# Patient Record
Sex: FEMALE | Race: WHITE | Employment: FULL TIME | URBAN - METROPOLITAN AREA
[De-identification: names, ages, dates, MRNs, and addresses within clinical notes are randomized per-mention and may not be internally consistent; named-entity substitution may affect disease eponyms.]

---

## 2017-02-28 RX ORDER — SIMVASTATIN 20 MG/1
TABLET, FILM COATED ORAL
Qty: 30 TAB | Refills: 3 | Status: SHIPPED | OUTPATIENT
Start: 2017-02-28 | End: 2017-07-01 | Stop reason: SDUPTHER

## 2017-03-01 DIAGNOSIS — I10 ESSENTIAL HYPERTENSION, BENIGN: ICD-10-CM

## 2017-03-02 RX ORDER — VALSARTAN 160 MG/1
TABLET ORAL
Qty: 30 TAB | Refills: 3 | Status: SHIPPED | OUTPATIENT
Start: 2017-03-02 | End: 2017-07-01 | Stop reason: SDUPTHER

## 2017-03-11 DIAGNOSIS — G47.00 INSOMNIA, UNSPECIFIED TYPE: ICD-10-CM

## 2017-03-13 NOTE — TELEPHONE ENCOUNTER
Spoke with patient, told her medication refill will be forwarded to Dr. Carmelita Jane, and will be in office 3-14-17.     I also asked patient to schedule appointment for HTN management, LOV was 6/2016

## 2017-03-14 RX ORDER — PANTOPRAZOLE SODIUM 40 MG/1
TABLET, DELAYED RELEASE ORAL
Qty: 30 TAB | Refills: 4 | Status: SHIPPED | OUTPATIENT
Start: 2017-03-14 | End: 2017-08-23 | Stop reason: SDUPTHER

## 2017-03-14 RX ORDER — TRAZODONE HYDROCHLORIDE 50 MG/1
TABLET ORAL
Qty: 30 TAB | Refills: 4 | Status: SHIPPED | OUTPATIENT
Start: 2017-03-14 | End: 2017-08-23 | Stop reason: SDUPTHER

## 2017-03-16 DIAGNOSIS — J45.30 ALLERGIC ASTHMA, MILD PERSISTENT, UNCOMPLICATED: ICD-10-CM

## 2017-03-16 DIAGNOSIS — J30.9 ALLERGIC RHINITIS: ICD-10-CM

## 2017-03-16 RX ORDER — MONTELUKAST SODIUM 10 MG/1
TABLET ORAL
Qty: 30 TAB | Refills: 5 | Status: SHIPPED | OUTPATIENT
Start: 2017-03-16 | End: 2017-08-23 | Stop reason: SDUPTHER

## 2017-06-01 DIAGNOSIS — I10 ESSENTIAL HYPERTENSION, BENIGN: ICD-10-CM

## 2017-06-01 RX ORDER — METOPROLOL TARTRATE 100 MG/1
TABLET ORAL
Qty: 90 TAB | Refills: 4 | Status: SHIPPED | OUTPATIENT
Start: 2017-06-01 | End: 2017-08-23 | Stop reason: ALTCHOICE

## 2017-07-01 DIAGNOSIS — I10 ESSENTIAL HYPERTENSION, BENIGN: ICD-10-CM

## 2017-07-03 RX ORDER — SIMVASTATIN 20 MG/1
TABLET, FILM COATED ORAL
Qty: 30 TAB | Refills: 3 | Status: SHIPPED | OUTPATIENT
Start: 2017-07-03 | End: 2017-08-23

## 2017-07-03 RX ORDER — VALSARTAN 160 MG/1
TABLET ORAL
Qty: 30 TAB | Refills: 3 | Status: SHIPPED | OUTPATIENT
Start: 2017-07-03 | End: 2017-08-23 | Stop reason: SDUPTHER

## 2017-08-12 DIAGNOSIS — G47.00 INSOMNIA, UNSPECIFIED TYPE: ICD-10-CM

## 2017-08-14 DIAGNOSIS — G47.00 INSOMNIA, UNSPECIFIED TYPE: ICD-10-CM

## 2017-08-15 RX ORDER — PANTOPRAZOLE SODIUM 40 MG/1
TABLET, DELAYED RELEASE ORAL
Qty: 30 TAB | Refills: 4 | OUTPATIENT
Start: 2017-08-15

## 2017-08-15 RX ORDER — TRAZODONE HYDROCHLORIDE 50 MG/1
TABLET ORAL
Qty: 30 TAB | Refills: 4 | OUTPATIENT
Start: 2017-08-15

## 2017-08-15 NOTE — TELEPHONE ENCOUNTER
Patient not in office for > 1 year. Please call and confirm she remains and active patient at this office and if so she needs to make follow-up appointment for refills.      Thanks  Irma Domínguez MD

## 2017-08-16 RX ORDER — TRAZODONE HYDROCHLORIDE 50 MG/1
TABLET ORAL
Qty: 30 TAB | Refills: 4 | OUTPATIENT
Start: 2017-08-16

## 2017-08-16 RX ORDER — PANTOPRAZOLE SODIUM 40 MG/1
TABLET, DELAYED RELEASE ORAL
Qty: 30 TAB | Refills: 4 | OUTPATIENT
Start: 2017-08-16

## 2017-08-16 NOTE — TELEPHONE ENCOUNTER
Pharmacy notified pt has not been in office in over a year. Pharmacy states they would let pt know f/u needed before refills can be given.

## 2017-08-23 ENCOUNTER — OFFICE VISIT (OUTPATIENT)
Dept: INTERNAL MEDICINE CLINIC | Age: 47
End: 2017-08-23

## 2017-08-23 VITALS
BODY MASS INDEX: 24.61 KG/M2 | HEIGHT: 68 IN | HEART RATE: 75 BPM | TEMPERATURE: 99 F | RESPIRATION RATE: 20 BRPM | DIASTOLIC BLOOD PRESSURE: 76 MMHG | OXYGEN SATURATION: 99 % | SYSTOLIC BLOOD PRESSURE: 127 MMHG | WEIGHT: 162.4 LBS

## 2017-08-23 DIAGNOSIS — G47.00 INSOMNIA, UNSPECIFIED TYPE: ICD-10-CM

## 2017-08-23 DIAGNOSIS — R74.8 ELEVATED LIVER ENZYMES: ICD-10-CM

## 2017-08-23 DIAGNOSIS — F10.20 UNCOMPLICATED ALCOHOL DEPENDENCE (HCC): ICD-10-CM

## 2017-08-23 DIAGNOSIS — J30.2 SEASONAL ALLERGIC RHINITIS, UNSPECIFIED ALLERGIC RHINITIS TRIGGER: ICD-10-CM

## 2017-08-23 DIAGNOSIS — I10 ESSENTIAL HYPERTENSION, BENIGN: ICD-10-CM

## 2017-08-23 DIAGNOSIS — A60.00 GENITAL HERPES SIMPLEX, UNSPECIFIED SITE: ICD-10-CM

## 2017-08-23 DIAGNOSIS — K29.70 GASTRITIS WITHOUT BLEEDING, UNSPECIFIED CHRONICITY, UNSPECIFIED GASTRITIS TYPE: ICD-10-CM

## 2017-08-23 DIAGNOSIS — J45.30 ALLERGIC ASTHMA, MILD PERSISTENT, UNCOMPLICATED: ICD-10-CM

## 2017-08-23 DIAGNOSIS — E83.42 HYPOMAGNESEMIA: ICD-10-CM

## 2017-08-23 DIAGNOSIS — A60.04 HERPES SIMPLEX VULVOVAGINITIS: ICD-10-CM

## 2017-08-23 DIAGNOSIS — E78.5 HYPERLIPIDEMIA, UNSPECIFIED HYPERLIPIDEMIA TYPE: Primary | ICD-10-CM

## 2017-08-23 RX ORDER — VALACYCLOVIR HYDROCHLORIDE 500 MG/1
500 TABLET, FILM COATED ORAL DAILY
Qty: 90 TAB | Refills: 3 | Status: SHIPPED | OUTPATIENT
Start: 2017-08-23 | End: 2018-08-16 | Stop reason: SDUPTHER

## 2017-08-23 RX ORDER — VALSARTAN 160 MG/1
160 TABLET ORAL DAILY
Qty: 90 TAB | Refills: 3 | Status: SHIPPED | OUTPATIENT
Start: 2017-08-23 | End: 2017-10-12 | Stop reason: SDUPTHER

## 2017-08-23 RX ORDER — TRAZODONE HYDROCHLORIDE 50 MG/1
50 TABLET ORAL
Qty: 90 TAB | Refills: 3 | Status: SHIPPED | OUTPATIENT
Start: 2017-08-23 | End: 2018-08-16 | Stop reason: SDUPTHER

## 2017-08-23 RX ORDER — PANTOPRAZOLE SODIUM 40 MG/1
TABLET, DELAYED RELEASE ORAL
Qty: 90 TAB | Refills: 4 | Status: SHIPPED | OUTPATIENT
Start: 2017-08-23 | End: 2018-03-09 | Stop reason: SDUPTHER

## 2017-08-23 RX ORDER — METOPROLOL SUCCINATE 100 MG/1
100 TABLET, EXTENDED RELEASE ORAL DAILY
Qty: 90 TAB | Refills: 3 | Status: SHIPPED | OUTPATIENT
Start: 2017-08-23 | End: 2018-08-16 | Stop reason: SDUPTHER

## 2017-08-23 RX ORDER — MONTELUKAST SODIUM 10 MG/1
10 TABLET ORAL DAILY
Qty: 90 TAB | Refills: 5 | Status: SHIPPED | OUTPATIENT
Start: 2017-08-23 | End: 2018-08-16 | Stop reason: SDUPTHER

## 2017-08-23 RX ORDER — ATORVASTATIN CALCIUM 10 MG/1
10 TABLET, FILM COATED ORAL DAILY
Qty: 90 TAB | Refills: 3 | Status: SHIPPED | OUTPATIENT
Start: 2017-08-23 | End: 2018-08-16 | Stop reason: SDUPTHER

## 2017-08-23 NOTE — MR AVS SNAPSHOT
Visit Information Date & Time Provider Department Dept. Phone Encounter #  
 8/23/2017  2:30 PM Shamir Randolph MD [address] [facility] Internal Medicine [phone] [id number] Follow-up Instructions Return for well woman visit, review of vaccines and fasting labs. Kee Wells Upcoming Health Maintenance Date Due Pneumococcal 19-64 Medium Risk (1 of 1 - PPSV23) 12/31/1989 PAP AKA CERVICAL CYTOLOGY 12/31/1991 INFLUENZA AGE 9 TO ADULT 8/1/2017 DTaP/Tdap/Td series (2 - Td) 6/1/2026 Allergies as of 8/23/2017  Review Complete On: 8/23/2017 By: Madeline Griffin LPN No Known Allergies Current Immunizations  Never Reviewed Name Date Influenza Vaccine (Quad) PF 9/17/2014 Tdap 6/1/2016 Not reviewed this visit You Were Diagnosed With   
  
 Codes Comments Hyperlipidemia, unspecified hyperlipidemia type    -  Primary ICD-10-CM: E78.5 ICD-9-CM: 272.4 Insomnia, unspecified type     ICD-10-CM: G47.00 ICD-9-CM: 780.52 Seasonal allergic rhinitis, unspecified allergic rhinitis trigger     ICD-10-CM: J30.2 ICD-9-CM: 477.9 Allergic asthma, mild persistent, uncomplicated     QXP-72-LD: J45.30 ICD-9-CM: 493.00 Essential hypertension, benign     ICD-10-CM: I10 
ICD-9-CM: 401.1 Genital herpes simplex, unspecified site     ICD-10-CM: A60.00 ICD-9-CM: 054.10 Gastritis without bleeding, unspecified chronicity, unspecified gastritis type     ICD-10-CM: K29.70 ICD-9-CM: 535.50 Uncomplicated alcohol dependence ([address]     ICD-10-CM: F10.20 ICD-9-CM: 303.90 Elevated liver enzymes     ICD-10-CM: R74.8 ICD-9-CM: 790.5 Herpes simplex vulvovaginitis     ICD-10-CM: A60.04 
ICD-9-CM: 054.11 Hypomagnesemia     ICD-10-CM: F41.79 
ICD-9-CM: 275.2 Vitals BP Pulse Temp Resp Height(growth percentile) Weight(growth percentile)  127/76 (BP 1 Location: Left arm, BP Patient Position: Sitting) 75 99 °F (37.2 °C) (Oral) 20 5' 8\" (1.727 m) 162 lb 6.4 oz (73.7 kg) SpO2 BMI OB Status Smoking Status 99% 24.69 kg/m2 IUD Former Smoker BMI and BSA Data Body Mass Index Body Surface Area  
 24.69 kg/m 2 1.88 m 2 Preferred Pharmacy Pharmacy Name Phone [facility], [address] [phone] Your Updated Medication List  
  
   
This list is accurate as of: 8/23/17  3:21 PM.  Always use your most recent med list.  
  
  
  
  
 ACIDOPHILUS Cap Generic drug:  Lactobacillus acidophilus Take 2 Caps by mouth two (2) times a day. albuterol 90 mcg/actuation inhaler Commonly known as:  PROVENTIL HFA, VENTOLIN HFA, PROAIR HFA Take 2 Puffs by inhalation every four (4) hours as needed for Wheezing. atorvastatin 10 mg tablet Commonly known as:  LIPITOR Take 1 Tab by mouth daily. levonorgestrel 20 mcg/24 hr (5 years) IUD Commonly known as:  MIRENA  
1 Each by IntraUTERine route once. metoprolol succinate 100 mg tablet Commonly known as:  TOPROL-XL Take 1 Tab by mouth daily. montelukast 10 mg tablet Commonly known as:  SINGULAIR Take 1 Tab by mouth daily. pantoprazole 40 mg tablet Commonly known as:  PROTONIX  
take 1 tablet by mouth once daily  
  
 traZODone 50 mg tablet Commonly known as:  Gwendlyn Lean Take 1 Tab by mouth nightly. valACYclovir 500 mg tablet Commonly known as:  VALTREX Take 1 Tab by mouth daily. Indications: GENITAL HERPES SIMPLEX  
  
 valsartan 160 mg tablet Commonly known as:  DIOVAN Take 1 Tab by mouth daily. Prescriptions Sent to Pharmacy Refills  
 valACYclovir (VALTREX) 500 mg tablet 3 Sig: Take 1 Tab by mouth daily. Indications: GENITAL HERPES SIMPLEX Class: Normal  
 Pharmacy: [facility] [address] [facility], [address], [address] Ph #: [phone]  Route: Oral  
 pantoprazole (PROTONIX) 40 mg tablet 4  
 Sig: take 1 tablet by mouth once daily Class: Normal  
 Pharmacy: [address] [facility], [address] [place] [address] Ph #: [phone]  
 traZODone (DESYREL) 50 mg tablet 3 Sig: Take 1 Tab by mouth nightly. Class: Normal  
 Pharmacy: [address], [address] [place] [address] Ph #: [phone] Route: Oral  
 montelukast (SINGULAIR) 10 mg tablet 5 Sig: Take 1 Tab by mouth daily. Class: Normal  
 Pharmacy: [address], [address] [place] [address] Ph #: [phone] Route: Oral  
 valsartan (DIOVAN) 160 mg tablet 3 Sig: Take 1 Tab by mouth daily. Class: Normal  
 Pharmacy: [address], [address] [place] [address] Ph #: [phone] Route: Oral  
 atorvastatin (LIPITOR) 10 mg tablet 3 Sig: Take 1 Tab by mouth daily. Class: Normal  
 Pharmacy: [address], [address] [place] [address] Ph #: [phone] Route: Oral  
 metoprolol succinate (TOPROL-XL) 100 mg tablet 3 Sig: Take 1 Tab by mouth daily. Class: Normal  
 Pharmacy: [address], [address] [place] [address] Ph #: [phone] Route: Oral  
  
We Performed the Following CBC WITH AUTOMATED DIFF [41468 CPT(R)] LIPID PANEL [84436 CPT(R)] METABOLIC PANEL, COMPREHENSIVE [23646 CPT(R)] Follow-up Instructions Return for well woman visit, review of vaccines and fasting labs. .  
  
  
Patient Instructions Learning About Sleeping Well What does sleeping well mean? Sleeping well means getting enough sleep. How much sleep is enough varies among people. The number of hours you sleep is not as important as how you feel when you wake up. If you do not feel refreshed, you probably need more sleep. Another sign of not getting enough sleep is feeling tired during the day. The average total nightly sleep time is 7½ to 8 hours. Healthy adults may need a little more or a little less than this. Why is getting enough sleep important? Getting enough quality sleep is a basic part of good health. When your sleep suffers, your mood and your thoughts can suffer too. You may find yourself feeling more grumpy or stressed. Not getting enough sleep also can lead to serious problems, including injury, accidents, anxiety, and depression. What might cause poor sleeping? Many things can cause sleep problems, including: · Stress. Stress can be caused by fear about a single event, such as giving a speech. Or you may have ongoing stress, such as worry about work or school. · Depression, anxiety, and other mental or emotional conditions. · Changes in your sleep habits or surroundings. This includes changes that happen where you sleep, such as noise, light, or sleeping in a different bed. It also includes changes in your sleep pattern, such as having jet lag or working a late shift. · Health problems, such as pain, breathing problems, and restless legs syndrome. · Lack of regular exercise. How can you help yourself? Here are some tips that may help you sleep more soundly and wake up feeling more refreshed. Your sleeping area · Use your bedroom only for sleeping and sex. A bit of light reading may help you fall asleep. But if it doesn't, do your reading elsewhere in the house. Don't watch TV in bed. · Be sure your bed is big enough to stretch out comfortably, especially if you have a sleep partner. · Keep your bedroom quiet, dark, and cool. Use curtains, blinds, or a sleep mask to block out light. To block out noise, use earplugs, soothing music, or a \"white noise\" machine. Your evening and bedtime routine · Create a relaxing bedtime routine. You might want to take a warm shower or bath, listen to soothing music, or drink a cup of noncaffeinated tea. · Go to bed at the same time every night. And get up at the same time every morning, even if you feel tired. What to avoid · Limit caffeine (coffee, tea, caffeinated sodas) during the day, and don't have any for at least 4 to 6 hours before bedtime. · Don't drink alcohol before bedtime. Alcohol can cause you to wake up more often during the night. · Don't smoke or use tobacco, especially in the evening. Nicotine can keep you awake. · Don't take naps during the day, especially close to bedtime. · Don't lie in bed awake for too long. If you can't fall asleep, or if you wake up in the middle of the night and can't get back to sleep within 15 minutes or so, get out of bed and go to another room until you feel sleepy. · Don't take medicine right before bed that may keep you awake or make you feel hyper or energized. Your doctor can tell you if your medicine may do this and if you can take it earlier in the day. If you can't sleep · Imagine yourself in a peaceful, pleasant scene. Focus on the details and feelings of being in a place that is relaxing. · Get up and do a quiet or boring activity until you feel sleepy. · Don't drink any liquids after 6 p.m. if you wake up often because you have to go to the bathroom. Where can you learn more? Go to http://aissatou-yasir.info/. Enter V235 in the search box to learn more about \"Learning About Sleeping Well. \" Current as of: July 26, 2016 Content Version: 11.3 © 2751-6915 "[employer]". Care instructions adapted under license by [employer] (which disclaims liability or warranty for this information). If you have questions about a medical condition or this instruction, always ask your healthcare professional. [place] [address] any warranty or liability for your use of this information. Hepatitis: Care Instructions Your Care Instructions Hepatitis is inflammation of the liver. It's caused most often by a virus. It can also be caused by heavy drinking over a long time. Certain medicines can make hepatitis worse. When this condition is severe, the liver can't remove waste from the body. Your body also can't do its other jobs as it should. · Hepatitis A is spread by food or water that has the virus. This type doesn't lead to long-term liver problems. · Hepatitis B is spread through infected blood, semen, or other body fluids during sex. It's also spread by sharing needles to inject drugs. Most people who have it get better after 4 to 8 weeks. After you have had this virus, you will not get it again. If it stays in your body for a long time, it can cause serious liver damage. · Hepatitis C is spread by sharing needles to use drugs. It is sometimes spread through infected blood, semen, or other body fluids during sex. Most people who have this virus have a long-term infection. Sometimes it causes severe liver damage. · Hepatitis from alcohol use can lead to severe liver problems. If you stop drinking, your liver most often will get better. · Some medicines can cause liver damage. These include over-the-counter and herbal medicines. The infection most often goes away when you stop taking the medicine. But this may not be the case if serious liver damage has already happened. · Hepatitis also can be caused when the immune system attacks the liver. This is called autoimmune hepatitis. You can help your liver healor lower the chance of liver damageby following your doctor's advice. Follow-up care is a key part of your treatment and safety. Be sure to make and go to all appointments, and call your doctor if you are having problems. It's also a good idea to know your test results and keep a list of the medicines you take. How can you care for yourself at home? · Be safe with medicines.  If your doctor prescribes antiviral medicine, take it exactly as directed. Do not stop or change a medicine without talking to your doctor first. 
· Lower your activity to match your energy. · Avoid alcohol for as long as your doctor says. Alcohol can make liver problems worse. Tell your doctor if you need help to quit. Counseling, support groups, and sometimes medicines can help you stay sober. · Make sure your doctor knows all the medicines you take. Do not take any new medicines unless your doctor says it is okay. · Follow your doctor's advice about your diet. · If you have itchy skin, keep cool, stay out of the sun. Try to wear cotton clothing. Talk to your doctor about using over-the-counter medicines for itching. These include diphenhydramine (Benadryl) and chlorpheniramine (Chlor-Trimeton). Follow the instructions on the label. To prevent spreading hepatitis B or C 
· Tell the people you live with or have sex with about your illness as soon as you can. · Don't donate blood or blood products, organs, semen, or eggs (ova). · Stop all sexual activity or use latex condoms until your doctor tells you that you can no longer give the virus to others. Avoid anal contact with a sex partner while you are infected. · Don't share your personal items. These include razors, toothbrushes, towels, and nail files. · Tell your doctor, dentist, and anyone else who may come in contact with your blood about your illness. · If you are pregnant, tell the doctor who will deliver your baby about your illness. If you have hepatitis B, be sure your baby gets medicine to prevent infection. This should start right after birth. · Clean or carefully get rid of anything that has your blood on it. This includes clothing and sanitary pads. · Make sure to clean surfaces that have your blood or any other body fluid on them. Examples are semen and menstrual blood. Use a solution of 1 part bleach to 10 parts water. Clean toilet seats, countertops, and floors. To prevent hepatitis A · Always wash your hands after you use the bathroom. And be sure to wash them before you touch food. · If you have been exposed to someone who may have hepatitis A, ask your doctor about a shot of immune globulin. (This is also called gamma globulin.) It can help your body fight the infection. When should you call for help? Call 911 anytime you think you may need emergency care. For example, call if: 
· You passed out (lost consciousness). · You have severe belly pain or swelling. · You have severe problems breathing. · You vomit blood. Call your doctor now or seek immediate medical care if: 
· You are confused, or your confusion gets worse. · You have a fever or chills. · Your skin or the whites of your eyes turn yellow or get more yellow. · You have belly pain or swelling. · You vomit or feel sick to your stomach. · Your urine is dark yellow-brown, or your stools are light-colored. · Your stools are black and tarlike or have streaks of blood. Watch closely for changes in your health, and be sure to contact your doctor if: 
· Your skin itches, or itching gets worse. · You are not able to eat well and are losing weight. · You feel more tired than usual. 
Where can you learn more? Go to http://aissatou-yasir.info/. Enter [phone] in the search box to learn more about \"Hepatitis: Care Instructions. \" Current as of: March 3, 2017 Content Version: 11.3 © 4852-4318 [employer]. Care instructions adapted under license by [employer] (which disclaims liability or warranty for this information). If you have questions about a medical condition or this instruction, always ask your healthcare professional. [place] [address] any warranty or liability for your use of this information. Introducing \[facility] [place] [facility]\"" & HEALTH SERVICES!    
 [facility] introduces [employer] patient portal. Now you can access parts of your medical record, email your doctor's office, and request medication refills online. 1. In your internet browser, go to [email or web address] 2. Click on the First Time User? Click Here link in the Sign In box. You will see the New Member Sign Up page. 3. Enter your [employer] Access Code exactly as it appears below. You will not need to use this code after youve completed the sign-up process. If you do not sign up before the expiration date, you must request a new code. · [employer] Access Code: [email or web address] Expires: 11/21/2017  2:38 PM 
 
4. Enter the last four digits of your Social Security Number (xxxx) and Date of Birth (mm/dd/yyyy) as indicated and click Submit. You will be taken to the next sign-up page. 5. Create a [employer] ID. This will be your [employer] login ID and cannot be changed, so think of one that is secure and easy to remember. 6. Create a [employer] password. You can change your password at any time. 7. Enter your Password Reset Question and Answer. This can be used at a later time if you forget your password. 8. Enter your e-mail address. You will receive e-mail notification when new information is available in [address] 9. Click Sign Up. You can now view and download portions of your medical record. 10. Click the Download Summary menu link to download a portable copy of your medical information. If you have questions, please visit the Frequently Asked Questions section of the [employer] website. Remember, [employer] is NOT to be used for urgent needs. For medical emergencies, dial 911. Now available from your iPhone and Android! Please provide this summary of care documentation to your next provider. Your primary care clinician is listed as Geraldine Salinas. If you have any questions after today's visit, please call [phone].

## 2017-08-23 NOTE — PROGRESS NOTES
Rm#2  Chief Complaint   Patient presents with    Medication Evaluation     1. Have you been to the ER, urgent care clinic since your last visit? Hospitalized since your last visit? 2. Have you seen or consulted any other health care providers outside of the [address] [place] [address] since your last visit? Include any pap smears or colon screening.    Health Maintenance Due   Topic Date Due    Pneumococcal 19-64 Medium Risk (1 of 1 - PPSV23) 12/31/1989    PAP AKA CERVICAL CYTOLOGY  12/31/1991    INFLUENZA AGE 9 TO ADULT  08/01/2017   PAP 9-2016 star j[facility]   Flu vaccine doesn't take vaccine   reviewed     PHQ over the last two weeks 8/23/2017   Little interest or pleasure in doing things Not at all   Feeling down, depressed or hopeless Not at all   Total Score PHQ 2 0

## 2017-08-23 NOTE — PROGRESS NOTES
HPI   Stacey Denton is a 55 y.o. female, she presents today for:    No longer follows with gastroenterologist. States that her body overproduces acid. Has always been on a acid blockade. Last endoscopy 3 years ago. Was diagnosed with crohn's. History of ulcers inside of intestines. Stopped drinking ice tea and feeling better. No visible blood in stool. Insomnia. Takes trazodone daily. Singulair: takes as needed for allergies. Continues to follow with gynecologist for IUD care and annual.     Reports that she is not drinking every day. Notes that she tends to drink \"5\" when she does drink maybe 3 days per week. PMH/PSH: reviewed and updated  Sochx:  reports that she quit smoking about 5 years ago. She smoked 0.25 packs per day. She has never used smokeless tobacco. She reports that she drinks about 15.0 oz of alcohol per week  She reports that she does not use illicit drugs. Famhx: reviewed and updated     All: No Known Allergies  Med:   Current Outpatient Prescriptions   Medication Sig    simvastatin (ZOCOR) 20 mg tablet take 1 tablet by mouth at bedtime    valsartan (DIOVAN) 160 mg tablet take 1 tablet by mouth once daily    metoprolol tartrate (LOPRESSOR) 100 mg IR tablet take 1 tablet by mouth daily    montelukast (SINGULAIR) 10 mg tablet take 1 tablet by mouth once daily    traZODone (DESYREL) 50 mg tablet take 1 tablet by mouth NIGHTLY    pantoprazole (PROTONIX) 40 mg tablet take 1 tablet by mouth once daily    levonorgestrel (MIRENA) 20 mcg/24 hr (5 years) IUD 1 Each by IntraUTERine route once.  Lactobacillus acidophilus (ACIDOPHILUS) cap Take 2 Caps by mouth two (2) times a day.  valACYclovir (VALTREX) 500 mg tablet 500 mg daily.  albuterol (PROVENTIL HFA, VENTOLIN HFA, PROAIR HFA) 90 mcg/actuation inhaler Take 2 Puffs by inhalation every four (4) hours as needed for Wheezing.  ergocalciferol (ERGOCALCIFEROL) 50,000 unit capsule Take 1 Cap by mouth every seven (7) days.  mesalamine (PENTASA) 500 mg CR capsule Take 1,000 mg by mouth two (2) times a day. Indications: CROHN'S DISEASE    triamcinolone (ARISTOCORT) 0.5 % topical cream Apply  to affected area two (2) times a day. use thin layer  Indications: CONTACT DERMATITIS     No current facility-administered medications for this visit. Review of Systems   Constitutional: Negative for chills, fever and malaise/fatigue. Respiratory: Negative for shortness of breath. Cardiovascular: Negative for chest pain. PE:  Blood pressure 127/76, pulse 75, temperature 99 °F (37.2 °C), temperature source Oral, resp. rate 20, height 5' 8\" (1.727 m), weight 162 lb 6.4 oz (73.7 kg), SpO2 99 %. Body mass index is 24.69 kg/(m^2). Physical Exam   Constitutional: She is oriented to person, place, and time. She appears well-developed and well-nourished. No distress. HENT:   Head: Normocephalic. Mouth/Throat: Oropharynx is clear and moist.   Eyes: Conjunctivae and EOM are normal. Pupils are equal, round, and reactive to light. Neck: Neck supple. Cardiovascular: Normal rate, regular rhythm and normal heart sounds. Pulmonary/Chest: Effort normal and breath sounds normal.   Lymphadenopathy:     She has no cervical adenopathy. Neurological: She is alert and oriented to person, place, and time. Skin: Skin is warm and dry. Labs:   See addendum    A/P:  55 y.o. female    ICD-10-CM ICD-9-CM    1. Hyperlipidemia, unspecified hyperlipidemia type E78.5 272.4 atorvastatin (LIPITOR) 10 mg tablet   2. Insomnia, unspecified type G47.00 780.52 traZODone (DESYREL) 50 mg tablet      CBC WITH AUTOMATED DIFF   3. Seasonal allergic rhinitis, unspecified allergic rhinitis trigger J30.2 477.9 montelukast (SINGULAIR) 10 mg tablet   4. Allergic asthma, mild persistent, uncomplicated N50.04 131.26 montelukast (SINGULAIR) 10 mg tablet   5.  Essential hypertension, benign I10 401.1 valsartan (DIOVAN) 160 mg tablet      metoprolol succinate (TOPROL-XL) 100 mg tablet   6. Genital herpes simplex, unspecified site A60.00 054.10 valACYclovir (VALTREX) 500 mg tablet   7. Gastritis without bleeding, unspecified chronicity, unspecified gastritis type K29.70 535.50 pantoprazole (PROTONIX) 40 mg tablet   8. Alcohol dependence ([facility] [address] F10.20 303.90 LIPID PANEL      METABOLIC PANEL, COMPREHENSIVE      CBC WITH AUTOMATED DIFF   9. Elevated liver enzymes R74.8 790.5    10. Herpes simplex vulvovaginitis A60.04 054.11    11. Hypomagnesemia A52.90 934.0 METABOLIC PANEL, COMPREHENSIVE     Alcohol Dependence: cut back on alcohol, but continues to drink heavier than is healthy. She is not ready to discuss as addiction. She is not ready to stop drinking. Continue discuss and encouarge.      Elevate Liver enzymes: likely 2/2 etoh. However given history of possible crohns will continue screen for other causes with autoimmune work-up as well as ferritin (iron overload)   - advised to abstain from etoh   - follow-up levels. History of gastritis and ulcer: continue Protonix and follow-up with GI .     Elevated glucose: A1C requested .     Insomnia and anxiety: does not want to be on daily anxiety medicine but would like prn.    - trial trazodone for sleep   - prn vistaril for prn anxiety treatment.      Mild intermittant asthma: albuterol refilled. Currently without exacervation: trial trazodone, advised against continuing xanxax    Patient to follow-up with Dr. Rosa Ferraro regarding ulcer. HSV - gential has never had an outbreak. On suppresion to protect patient partner. Discussed having partner tested to see if she could stop medicaiton. - She was given AVS and expressed understanding with the diagnosis and plan as discussed. Follow-up Disposition: Not on File  No future appointments.

## 2017-08-23 NOTE — PATIENT INSTRUCTIONS
Learning About Sleeping Well  What does sleeping well mean? Sleeping well means getting enough sleep. How much sleep is enough varies among people. The number of hours you sleep is not as important as how you feel when you wake up. If you do not feel refreshed, you probably need more sleep. Another sign of not getting enough sleep is feeling tired during the day. The average total nightly sleep time is 7½ to 8 hours. Healthy adults may need a little more or a little less than this. Why is getting enough sleep important? Getting enough quality sleep is a basic part of good health. When your sleep suffers, your mood and your thoughts can suffer too. You may find yourself feeling more grumpy or stressed. Not getting enough sleep also can lead to serious problems, including injury, accidents, anxiety, and depression. What might cause poor sleeping? Many things can cause sleep problems, including:  · Stress. Stress can be caused by fear about a single event, such as giving a speech. Or you may have ongoing stress, such as worry about work or school. · Depression, anxiety, and other mental or emotional conditions. · Changes in your sleep habits or surroundings. This includes changes that happen where you sleep, such as noise, light, or sleeping in a different bed. It also includes changes in your sleep pattern, such as having jet lag or working a late shift. · Health problems, such as pain, breathing problems, and restless legs syndrome. · Lack of regular exercise. How can you help yourself? Here are some tips that may help you sleep more soundly and wake up feeling more refreshed. Your sleeping area  · Use your bedroom only for sleeping and sex. A bit of light reading may help you fall asleep. But if it doesn't, do your reading elsewhere in the house. Don't watch TV in bed. · Be sure your bed is big enough to stretch out comfortably, especially if you have a sleep partner.   · Keep your bedroom quiet, dark, and cool. Use curtains, blinds, or a sleep mask to block out light. To block out noise, use earplugs, soothing music, or a \"white noise\" machine. Your evening and bedtime routine  · Create a relaxing bedtime routine. You might want to take a warm shower or bath, listen to soothing music, or drink a cup of noncaffeinated tea. · Go to bed at the same time every night. And get up at the same time every morning, even if you feel tired. What to avoid  · Limit caffeine (coffee, tea, caffeinated sodas) during the day, and don't have any for at least 4 to 6 hours before bedtime. · Don't drink alcohol before bedtime. Alcohol can cause you to wake up more often during the night. · Don't smoke or use tobacco, especially in the evening. Nicotine can keep you awake. · Don't take naps during the day, especially close to bedtime. · Don't lie in bed awake for too long. If you can't fall asleep, or if you wake up in the middle of the night and can't get back to sleep within 15 minutes or so, get out of bed and go to another room until you feel sleepy. · Don't take medicine right before bed that may keep you awake or make you feel hyper or energized. Your doctor can tell you if your medicine may do this and if you can take it earlier in the day. If you can't sleep  · Imagine yourself in a peaceful, pleasant scene. Focus on the details and feelings of being in a place that is relaxing. · Get up and do a quiet or boring activity until you feel sleepy. · Don't drink any liquids after 6 p.m. if you wake up often because you have to go to the bathroom. Where can you learn more? Go to http://aissatou-yasir.info/. Enter E396 in the search box to learn more about \"Learning About Sleeping Well. \"  Current as of: July 26, 2016  Content Version: 11.3  © 9339-4744 [employer].  Care instructions adapted under license by [employer] (which disclaims liability or warranty for this information). If you have questions about a medical condition or this instruction, always ask your healthcare professional. [address] any warranty or liability for your use of this information. Hepatitis: Care Instructions  Your Care Instructions  Hepatitis is inflammation of the liver. It's caused most often by a virus. It can also be caused by heavy drinking over a long time. Certain medicines can make hepatitis worse. When this condition is severe, the liver can't remove waste from the body. Your body also can't do its other jobs as it should. · Hepatitis A is spread by food or water that has the virus. This type doesn't lead to long-term liver problems. · Hepatitis B is spread through infected blood, semen, or other body fluids during sex. It's also spread by sharing needles to inject drugs. Most people who have it get better after 4 to 8 weeks. After you have had this virus, you will not get it again. If it stays in your body for a long time, it can cause serious liver damage. · Hepatitis C is spread by sharing needles to use drugs. It is sometimes spread through infected blood, semen, or other body fluids during sex. Most people who have this virus have a long-term infection. Sometimes it causes severe liver damage. · Hepatitis from alcohol use can lead to severe liver problems. If you stop drinking, your liver most often will get better. · Some medicines can cause liver damage. These include over-the-counter and herbal medicines. The infection most often goes away when you stop taking the medicine. But this may not be the case if serious liver damage has already happened. · Hepatitis also can be caused when the immune system attacks the liver. This is called autoimmune hepatitis. You can help your liver healor lower the chance of liver damageby following your doctor's advice. Follow-up care is a key part of your treatment and safety.  Be sure to make and go to all appointments, and call your doctor if you are having problems. It's also a good idea to know your test results and keep a list of the medicines you take. How can you care for yourself at home? · Be safe with medicines. If your doctor prescribes antiviral medicine, take it exactly as directed. Do not stop or change a medicine without talking to your doctor first.  · Lower your activity to match your energy. · Avoid alcohol for as long as your doctor says. Alcohol can make liver problems worse. Tell your doctor if you need help to quit. Counseling, support groups, and sometimes medicines can help you stay sober. · Make sure your doctor knows all the medicines you take. Do not take any new medicines unless your doctor says it is okay. · Follow your doctor's advice about your diet. · If you have itchy skin, keep cool, stay out of the sun. Try to wear cotton clothing. Talk to your doctor about using over-the-counter medicines for itching. These include diphenhydramine (Benadryl) and chlorpheniramine (Chlor-Trimeton). Follow the instructions on the label. To prevent spreading hepatitis B or C  · Tell the people you live with or have sex with about your illness as soon as you can. · Don't donate blood or blood products, organs, semen, or eggs (ova). · Stop all sexual activity or use latex condoms until your doctor tells you that you can no longer give the virus to others. Avoid anal contact with a sex partner while you are infected. · Don't share your personal items. These include razors, toothbrushes, towels, and nail files. · Tell your doctor, dentist, and anyone else who may come in contact with your blood about your illness. · If you are pregnant, tell the doctor who will deliver your baby about your illness. If you have hepatitis B, be sure your baby gets medicine to prevent infection. This should start right after birth. · Clean or carefully get rid of anything that has your blood on it.  This includes clothing and sanitary pads. · Make sure to clean surfaces that have your blood or any other body fluid on them. Examples are semen and menstrual blood. Use a solution of 1 part bleach to 10 parts water. Clean toilet seats, countertops, and floors. To prevent hepatitis A  · Always wash your hands after you use the bathroom. And be sure to wash them before you touch food. · If you have been exposed to someone who may have hepatitis A, ask your doctor about a shot of immune globulin. (This is also called gamma globulin.) It can help your body fight the infection. When should you call for help? Call 911 anytime you think you may need emergency care. For example, call if:  · You passed out (lost consciousness). · You have severe belly pain or swelling. · You have severe problems breathing. · You vomit blood. Call your doctor now or seek immediate medical care if:  · You are confused, or your confusion gets worse. · You have a fever or chills. · Your skin or the whites of your eyes turn yellow or get more yellow. · You have belly pain or swelling. · You vomit or feel sick to your stomach. · Your urine is dark yellow-brown, or your stools are light-colored. · Your stools are black and tarlike or have streaks of blood. Watch closely for changes in your health, and be sure to contact your doctor if:  · Your skin itches, or itching gets worse. · You are not able to eat well and are losing weight. · You feel more tired than usual.  Where can you learn more? Go to http://aissatou-yasir.info/. Enter 21 [phone] in the search box to learn more about \"Hepatitis: Care Instructions. \"  Current as of: March 3, 2017  Content Version: 11.3  © 9510-6335 [employer]. Care instructions adapted under license by [employer] (which disclaims liability or warranty for this information).  If you have questions about a medical condition or this instruction, always ask your healthcare professional. [address] any warranty or liability for your use of this information.

## 2017-09-02 LAB
ALBUMIN SERPL-MCNC: 4.9 G/DL (ref 3.5–5.5)
ALBUMIN/GLOB SERPL: 2 {RATIO} (ref 1.2–2.2)
ALP SERPL-CCNC: 54 IU/L (ref 39–117)
ALT SERPL-CCNC: 120 IU/L (ref 0–32)
AST SERPL-CCNC: 88 IU/L (ref 0–40)
BASOPHILS # BLD AUTO: 0.1 X10E3/UL (ref 0–0.2)
BASOPHILS NFR BLD AUTO: 1 %
BILIRUB SERPL-MCNC: 0.8 MG/DL (ref 0–1.2)
BUN SERPL-MCNC: 10 MG/DL (ref 6–24)
BUN/CREAT SERPL: 11 (ref 9–23)
CALCIUM SERPL-MCNC: 9.2 MG/DL (ref 8.7–10.2)
CHLORIDE SERPL-SCNC: 99 MMOL/L (ref 96–106)
CHOLEST SERPL-MCNC: 193 MG/DL (ref 100–199)
CO2 SERPL-SCNC: 20 MMOL/L (ref 18–29)
CREAT SERPL-MCNC: 0.92 MG/DL (ref 0.57–1)
EOSINOPHIL # BLD AUTO: 0.2 X10E3/UL (ref 0–0.4)
EOSINOPHIL NFR BLD AUTO: 3 %
ERYTHROCYTE [DISTWIDTH] IN BLOOD BY AUTOMATED COUNT: 13.6 % (ref 12.3–15.4)
GLOBULIN SER CALC-MCNC: 2.5 G/DL (ref 1.5–4.5)
GLUCOSE SERPL-MCNC: 99 MG/DL (ref 65–99)
HCT VFR BLD AUTO: 42.6 % (ref 34–46.6)
HDLC SERPL-MCNC: 71 MG/DL
HGB BLD-MCNC: 13.8 G/DL (ref 11.1–15.9)
IMM GRANULOCYTES # BLD: 0 X10E3/UL (ref 0–0.1)
IMM GRANULOCYTES NFR BLD: 0 %
LDLC SERPL CALC-MCNC: 91 MG/DL (ref 0–99)
LYMPHOCYTES # BLD AUTO: 1.9 X10E3/UL (ref 0.7–3.1)
LYMPHOCYTES NFR BLD AUTO: 29 %
MCH RBC QN AUTO: 32.5 PG (ref 26.6–33)
MCHC RBC AUTO-ENTMCNC: 32.4 G/DL (ref 31.5–35.7)
MCV RBC AUTO: 100 FL (ref 79–97)
MONOCYTES # BLD AUTO: 0.8 X10E3/UL (ref 0.1–0.9)
MONOCYTES NFR BLD AUTO: 13 %
NEUTROPHILS # BLD AUTO: 3.5 X10E3/UL (ref 1.4–7)
NEUTROPHILS NFR BLD AUTO: 54 %
PLATELET # BLD AUTO: 213 X10E3/UL (ref 150–379)
POTASSIUM SERPL-SCNC: 4 MMOL/L (ref 3.5–5.2)
PROT SERPL-MCNC: 7.4 G/DL (ref 6–8.5)
RBC # BLD AUTO: 4.25 X10E6/UL (ref 3.77–5.28)
SODIUM SERPL-SCNC: 143 MMOL/L (ref 134–144)
TRIGL SERPL-MCNC: 154 MG/DL (ref 0–149)
VLDLC SERPL CALC-MCNC: 31 MG/DL (ref 5–40)
WBC # BLD AUTO: 6.4 X10E3/UL (ref 3.4–10.8)

## 2017-09-06 ENCOUNTER — TELEPHONE (OUTPATIENT)
Dept: INTERNAL MEDICINE CLINIC | Age: 47
End: 2017-09-06

## 2017-09-06 DIAGNOSIS — K73.9 CHRONIC HEPATITIS (HCC): Primary | ICD-10-CM

## 2017-09-06 DIAGNOSIS — F10.20 UNCOMPLICATED ALCOHOL DEPENDENCE (HCC): ICD-10-CM

## 2017-09-06 RX ORDER — LANOLIN ALCOHOL/MO/W.PET/CERES
400 CREAM (GRAM) TOPICAL DAILY
Qty: 90 TAB | Refills: 4 | Status: SHIPPED | OUTPATIENT
Start: 2017-09-06 | End: 2021-04-09 | Stop reason: ALTCHOICE

## 2017-09-06 RX ORDER — LANOLIN ALCOHOL/MO/W.PET/CERES
100 CREAM (GRAM) TOPICAL DAILY
Qty: 90 TAB | Refills: 4 | Status: SHIPPED | OUTPATIENT
Start: 2017-09-06 | End: 2021-04-09 | Stop reason: ALTCHOICE

## 2017-09-06 NOTE — TELEPHONE ENCOUNTER
Please call and advise:     Liver enzymes continue to be elevated. Please advise to follow-up with liver specialist to discuss ongoing care for this chronic hepatitis. (referral to Dr. Anthony garcia)    - As discussed previously. She should stop drinking all forms of alcohol to reduce toxic burden on liver. - please also advise to take daily thiamine/folate vitamin supplement.  (sent to pharmacy)  Vinnie Aden MD

## 2017-09-06 NOTE — PROGRESS NOTES
Liver enzymes continue to be elevated. With history of liver inflammation. Suspect largely from alcohol use. Despite recommendation has not been willing/ready to stop drinking alcohol. Will have nurse call and advise to follow-up with liver specialist for further review and monitoring. Continue to recommend stopping alcohol. Continue to recommend thiamine and folate supplement. (elevated MCV).

## 2017-09-07 NOTE — TELEPHONE ENCOUNTER
Outgoing call, pt notified of results and recommendations.  Also advised to f/u with Dr. Merlinda Raring once she has had appt with specialist.

## 2017-10-12 ENCOUNTER — OFFICE VISIT (OUTPATIENT)
Dept: INTERNAL MEDICINE CLINIC | Age: 47
End: 2017-10-12

## 2017-10-12 VITALS
BODY MASS INDEX: 23.95 KG/M2 | OXYGEN SATURATION: 98 % | WEIGHT: 158 LBS | RESPIRATION RATE: 16 BRPM | HEIGHT: 68 IN | HEART RATE: 81 BPM | TEMPERATURE: 98.4 F

## 2017-10-12 DIAGNOSIS — Z00.00 WELL WOMAN EXAM (NO GYNECOLOGICAL EXAM): Primary | ICD-10-CM

## 2017-10-12 DIAGNOSIS — Z23 ENCOUNTER FOR IMMUNIZATION: ICD-10-CM

## 2017-10-12 DIAGNOSIS — R74.8 ELEVATED LIVER ENZYMES: ICD-10-CM

## 2017-10-12 DIAGNOSIS — F41.1 GAD (GENERALIZED ANXIETY DISORDER): ICD-10-CM

## 2017-10-12 DIAGNOSIS — F10.20 UNCOMPLICATED ALCOHOL DEPENDENCE (HCC): ICD-10-CM

## 2017-10-12 DIAGNOSIS — I10 ESSENTIAL HYPERTENSION, BENIGN: ICD-10-CM

## 2017-10-12 DIAGNOSIS — K50.90 CROHN'S DISEASE WITHOUT COMPLICATION, UNSPECIFIED GASTROINTESTINAL TRACT LOCATION (HCC): ICD-10-CM

## 2017-10-12 RX ORDER — SERTRALINE HYDROCHLORIDE 50 MG/1
50 TABLET, FILM COATED ORAL DAILY
Qty: 90 TAB | Refills: 0 | Status: SHIPPED | OUTPATIENT
Start: 2017-10-12 | End: 2018-01-05 | Stop reason: SDUPTHER

## 2017-10-12 RX ORDER — VALSARTAN 80 MG/1
80 TABLET ORAL DAILY
Qty: 90 TAB | Refills: 1 | Status: SHIPPED | OUTPATIENT
Start: 2017-10-12 | End: 2018-04-10 | Stop reason: SDUPTHER

## 2017-10-12 NOTE — MR AVS SNAPSHOT
Visit Information Date & Time Provider Department Dept. Phone Encounter #  
 10/12/2017 10:45 AM Ethan Gonzalez MD [facility] [phone] [id number] Follow-up Instructions Return in about 6 weeks (around 11/23/2017) for follow-up after starting new medication. Upcoming Health Maintenance Date Due Pneumococcal 19-64 Medium Risk (1 of 1 - PPSV23) 12/31/1989 PAP AKA CERVICAL CYTOLOGY 12/31/1991 INFLUENZA AGE 9 TO ADULT 8/1/2017 DTaP/Tdap/Td series (2 - Td) 6/1/2026 Allergies as of 10/12/2017  Review Complete On: 10/12/2017 By: Naomy Noe LPN No Known Allergies Current Immunizations  Never Reviewed Name Date Influenza Vaccine (Quad) PF  Incomplete, 9/17/2014 Pneumococcal Polysaccharide (PPSV-23)  Incomplete Tdap 6/1/2016 Not reviewed this visit You Were Diagnosed With   
  
 Codes Comments Well woman exam (no gynecological exam)    -  Primary ICD-10-CM: Z00.00 ICD-9-CM: V70.0 Encounter for immunization     ICD-10-CM: G90 ICD-9-CM: V03.89 Elevated liver enzymes     ICD-10-CM: R74.8 ICD-9-CM: 790.5 Uncomplicated alcohol dependence ([facility] [address]     ICD-10-CM: F10.20 ICD-9-CM: 303.90 Crohn's disease without complication, unspecified gastrointestinal tract location [facility])     ICD-10-CM: K50.90 ICD-9-CM: 555.9 Essential hypertension, benign     ICD-10-CM: I10 
ICD-9-CM: 401.1 DANNY (generalized anxiety disorder)     ICD-10-CM: F41.1 ICD-9-CM: 300.02 Vitals Pulse Temp Resp Height(growth percentile) Weight(growth percentile) SpO2  
 81 98.4 °F (36.9 °C) (Oral) 16 5' 8\" (1.727 m) 158 lb (71.7 kg) 98% BMI OB Status Smoking Status 24.02 kg/m2 IUD Former Smoker Vitals History BMI and BSA Data Body Mass Index Body Surface Area 24.02 kg/m 2 1.85 m 2 Preferred Pharmacy Pharmacy Name Phone [facility], [address] [phone] Your Updated Medication List  
  
   
This list is accurate as of: 10/12/17 11:47 AM.  Always use your most recent med list.  
  
  
  
  
 ACIDOPHILUS Cap Generic drug:  Lactobacillus acidophilus Take 2 Caps by mouth two (2) times a day. albuterol 90 mcg/actuation inhaler Commonly known as:  PROVENTIL HFA, VENTOLIN HFA, PROAIR HFA Take 2 Puffs by inhalation every four (4) hours as needed for Wheezing. atorvastatin 10 mg tablet Commonly known as:  LIPITOR Take 1 Tab by mouth daily. folic acid 861 mcg tablet Take 1 Tab by mouth daily. levonorgestrel 20 mcg/24 hr (5 years) IUD Commonly known as:  MIRENA  
1 Each by IntraUTERine route once. metoprolol succinate 100 mg tablet Commonly known as:  TOPROL-XL Take 1 Tab by mouth daily. montelukast 10 mg tablet Commonly known as:  SINGULAIR Take 1 Tab by mouth daily. pantoprazole 40 mg tablet Commonly known as:  PROTONIX  
take 1 tablet by mouth once daily  
  
 sertraline 50 mg tablet Commonly known as:  ZOLOFT Take 1 Tab by mouth daily. thiamine 100 mg tablet Commonly known as:  B-1 Take 1 Tab by mouth daily. traZODone 50 mg tablet Commonly known as:  Brad [place] Take 1 Tab by mouth nightly. valACYclovir 500 mg tablet Commonly known as:  VALTREX Take 1 Tab by mouth daily. Indications: GENITAL HERPES SIMPLEX  
  
 valsartan 80 mg tablet Commonly known as:  DIOVAN Take 1 Tab by mouth daily. Prescriptions Sent to Pharmacy Refills  
 valsartan (DIOVAN) 80 mg tablet 1 Sig: Take 1 Tab by mouth daily. Class: Normal  
 [facility]acy: [id number], [address], [address] Ph #: [phone] Route: Oral  
 sertraline (ZOLOFT) 50 mg tablet 0 Sig: Take 1 Tab by mouth daily.   
 Class: Normal  
 [id number], [address], [address] #: [phone] Route: Oral  
  
We Performed the Following INFLUENZA VIRUS VAC QUAD,SPLIT,PRESV FREE SYRINGE IM [id number] CPT(R)] PNEUMOCOCCAL POLYSACCHARIDE VACCINE, 23-VALENT, ADULT OR IMMUNOSUPPRESSED PT DOSE, [00835 CPT(R)] Follow-up Instructions Return in about 6 weeks (around 11/23/2017) for follow-up after starting new medication. Patient Instructions Dr. Chacha Viveros:  
 [address] [address], [address] [facility] 
[phone] · Free social support groups. These groups include AA (Alcoholics Anonymous) and SMART (Self-Management and Recovery Training). Well Visit, Ages 25 to 48: Care Instructions Your Care Instructions Physical exams can help you stay healthy. Your doctor has checked your overall health and may have suggested ways to take good care of yourself. He or she also may have recommended tests. At home, you can help prevent illness with healthy eating, regular exercise, and other steps. Follow-up care is a key part of your treatment and safety. Be sure to make and go to all appointments, and call your doctor if you are having problems. It's also a good idea to know your test results and keep a list of the medicines you take. How can you care for yourself at home? · Reach and stay at a healthy weight. This will lower your risk for many problems, such as obesity, diabetes, heart disease, and high blood pressure. · Get at least 30 minutes of physical activity on most days of the week. Walking is a good choice. You also may want to do other activities, such as running, swimming, cycling, or playing tennis or team sports. Discuss any changes in your exercise program with your doctor. · Do not smoke or allow others to smoke around you. If you need help quitting, talk to your doctor about stop-smoking programs and medicines. These can increase your chances of quitting for good. · Talk to your doctor about whether you have any risk factors for sexually transmitted infections (STIs). Having one sex partner (who does not have STIs and does not have sex with anyone else) is a good way to avoid these infections. · Use birth control if you do not want to have children at this time. Talk with your doctor about the choices available and what might be best for you. · Protect your skin from too much sun. When you're outdoors from 10 a.m. to 4 p.m., stay in the shade or cover up with clothing and a hat with a wide brim. Wear sunglasses that block UV rays. Even when it's cloudy, put broad-spectrum sunscreen (SPF 30 or higher) on any exposed skin. · See a dentist one or two times a year for checkups and to have your teeth cleaned. · Wear a seat belt in the car. · Drink alcohol in moderation, if at all. That means no more than 2 drinks a day for men and 1 drink a day for women. Follow your doctor's advice about when to have certain tests. These tests can spot problems early. For everyone · Cholesterol. Have the fat (cholesterol) in your blood tested after age 21. Your doctor will tell you how often to have this done based on your age, family history, or other things that can increase your risk for heart disease. · Blood pressure. Have your blood pressure checked during a routine doctor visit. Your doctor will tell you how often to check your blood pressure based on your age, your blood pressure results, and other factors. · Vision. Talk with your doctor about how often to have a glaucoma test. 
· Diabetes. Ask your doctor whether you should have tests for diabetes. · Colon cancer. Have a test for colon cancer at age 48. You may have one of several tests. If you are younger than 48, you may need a test earlier if you have any risk factors.  Risk factors include whether you already had a precancerous polyp removed from your colon or whether your parent, brother, sister, or child has had colon cancer. For women · Breast exam and mammogram. Talk to your doctor about when you should have a clinical breast exam and a mammogram. Medical experts differ on whether and how often women under 50 should have these tests. Your doctor can help you decide what is right for you. · Pap test and pelvic exam. Begin Pap tests at age 24. A Pap test is the best way to find cervical cancer. The test often is part of a pelvic exam. Ask how often to have this test. 
· Tests for sexually transmitted infections (STIs). Ask whether you should have tests for STIs. You may be at risk if you have sex with more than one person, especially if your partners do not wear condoms. For men · Tests for sexually transmitted infections (STIs). Ask whether you should have tests for STIs. You may be at risk if you have sex with more than one person, especially if you do not wear a condom. · Testicular cancer exam. Ask your doctor whether you should check your testicles regularly. · Prostate exam. Talk to your doctor about whether you should have a blood test (called a PSA test) for prostate cancer. Experts differ on whether and when men should have this test. Some experts suggest it if you are older than 39 and are -American or have a father or brother who got prostate cancer when he was younger than 72. When should you call for help? Watch closely for changes in your health, and be sure to contact your doctor if you have any problems or symptoms that concern you. Where can you learn more? Go to http://aissatou-yasir.info/. Enter P072 in the search box to learn more about \"Well Visit, Ages 25 to 48: Care Instructions. \" Current as of: July 19, 2016 Content Version: 11.3 © 6013-1397 [employer].  Care instructions adapted under license by [address] (which disclaims liability or warranty for this information). If you have questions about a medical condition or this instruction, always ask your healthcare professional. [address] any warranty or liability for your use of this information. Introducing [facility]! Zaina Johnson introduces [employer] patient portal. Now you can access parts of your medical record, email your doctor's office, and request medication refills online. 1. In your internet browser, go to [email or web address] 2. Click on the First Time User? Click Here link in the Sign In box. You will see the New Member Sign Up page. 3. Enter your [employer] Access Code exactly as it appears below. You will not need to use this code after youve completed the sign-up process. If you do not sign up before the expiration date, you must request a new code. · [employer] Access Code: [email or web address] Expires: 11/21/2017  2:38 PM 
 
4. Enter the last four digits of your Social Security Number (xxxx) and Date of Birth (mm/dd/yyyy) as indicated and click Submit. You will be taken to the next sign-up page. 5. Create a [employer] ID. This will be your [employer] login ID and cannot be changed, so think of one that is secure and easy to remember. 6. Create a [employer] password. You can change your password at any time. 7. Enter your Password Reset Question and Answer. This can be used at a later time if you forget your password. 8. Enter your e-mail address. You will receive e-mail notification when new information is available in [address] 9. Click Sign Up. You can now view and download portions of your medical record. 10. Click the Download Summary menu link to download a portable copy of your medical information. If you have questions, please visit the Frequently Asked Questions section of the [employer] website.  Remember, [employer] is NOT to be used for urgent needs. For medical emergencies, dial 911. Now available from your iPhone and Android! Please provide this summary of care documentation to your next provider. Your primary care clinician is listed as Issac Renee. If you have any questions after today's visit, please call [phone].

## 2017-10-12 NOTE — PROGRESS NOTES
HPI   Alexey Damon is a 55 y.o. female, she presents today for:    Notes that when she is not drinking she feels irritable and angry. Was taking zoloft during a pregnancy 11 years ago. Was on lexapro for a period of time from gastroenterologist    PMH/PSH: reviewed and updated  Sochx:  reports that she quit smoking about 6 years ago. She smoked 0.25 packs per day. She has never used smokeless tobacco. She reports that she drinks about 9.0 oz of alcohol per week  She reports that she does not use illicit drugs. Famhx: reviewed and updated     All: No Known Allergies  Med:   Current Outpatient Prescriptions   Medication Sig    thiamine (B-1) 100 mg tablet Take 1 Tab by mouth daily.  folic acid 714 mcg tablet Take 1 Tab by mouth daily.  valACYclovir (VALTREX) 500 mg tablet Take 1 Tab by mouth daily. Indications: GENITAL HERPES SIMPLEX    pantoprazole (PROTONIX) 40 mg tablet take 1 tablet by mouth once daily    traZODone (DESYREL) 50 mg tablet Take 1 Tab by mouth nightly.  montelukast (SINGULAIR) 10 mg tablet Take 1 Tab by mouth daily.  valsartan (DIOVAN) 160 mg tablet Take 1 Tab by mouth daily.  atorvastatin (LIPITOR) 10 mg tablet Take 1 Tab by mouth daily.  metoprolol succinate (TOPROL-XL) 100 mg tablet Take 1 Tab by mouth daily.  albuterol (PROVENTIL HFA, VENTOLIN HFA, PROAIR HFA) 90 mcg/actuation inhaler Take 2 Puffs by inhalation every four (4) hours as needed for Wheezing.  levonorgestrel (MIRENA) 20 mcg/24 hr (5 years) IUD 1 Each by IntraUTERine route once.  Lactobacillus acidophilus (ACIDOPHILUS) cap Take 2 Caps by mouth two (2) times a day. No current facility-administered medications for this visit. Review of Systems   Constitutional: Negative for chills, fever and malaise/fatigue. Respiratory: Negative for shortness of breath. Cardiovascular: Negative for chest pain. PE:  Pulse 81, temperature 98.4 °F (36.9 °C), temperature source Oral, resp.  rate 16, height 5' 8\" (1.727 m), weight 158 lb (71.7 kg), SpO2 98 %. Body mass index is 24.02 kg/(m^2). Physical Exam   Constitutional: She is oriented to person, place, and time. She appears well-developed and well-nourished. No distress. HENT:   Head: Normocephalic. Mouth/Throat: Oropharynx is clear and moist.   Eyes: Conjunctivae and EOM are normal.   Neck: Neck supple. Cardiovascular: Normal rate, regular rhythm and normal heart sounds. Pulmonary/Chest: Effort normal and breath sounds normal.   Neurological: She is alert and oriented to person, place, and time. Skin: Skin is warm and dry. Nursing note and vitals reviewed. Labs:   No results found for any visits on 10/12/17. A/P:  55 y.o. female    ICD-10-CM ICD-9-CM    1. Well woman exam (no gynecological exam) Z00.00 V70.0    2. Encounter for immunization Z23 V03.89 INFLUENZA VIRUS VAC QUAD,SPLIT,PRESV FREE SYRINGE IM      PNEUMOCOCCAL POLYSACCHARIDE VACCINE, 23-VALENT, ADULT OR IMMUNOSUPPRESSED PT DOSE,   3. Elevated liver enzymes R74.8 790.5    4. Alcohol dependence ([facility] [address] F10.20 303.90    5. Crohn's disease without complication, unspecified gastrointestinal tract location ([facility] K50.90 555.9    6. Essential hypertension, benign I10 401.1 valsartan (DIOVAN) 80 mg tablet   7. DANNY (generalized anxiety disorder) F41.1 300.02 sertraline (ZOLOFT) 50 mg tablet   Well woman (non-gyn) exam: history and exam revealed issues as noted below. Cancer screening: mammo and pap done with gyn dec 4th. - no family history of colon cancer   - stopped smoking 7 years ago. Vaccine status: flu and ppsv today  Cardiovascular risk: BP well controlled, lipid screen done. Bone health: daily vit D supplement recommended    Discussed need to stop drinking, follow-up with Dr. Valdo Posey regarding chronic liver inflammation. Patient tearful, agrees she feels she \"has to drink\" and generally has 2 mixed drinks every afternoon. Feels unable to stop because of irritability. Has a friend 5 years sober that she has had some dicussions with, however has never attended AA. - She was given AVS and expressed understanding with the diagnosis and plan as discussed. Follow-up Disposition:  Return in about 6 weeks (around 11/23/2017) for follow-up after starting new medication. No future appointments.

## 2017-10-12 NOTE — PATIENT INSTRUCTIONS
Dr. Kaylene Rothman:    Deedee Atkins, [address] [facility] [facility]  [phone]      · Free social support groups. These groups include AA (Alcoholics Anonymous) and SMART (Self-Management and Recovery Training). Well Visit, Ages 25 to 48: Care Instructions  Your Care Instructions  Physical exams can help you stay healthy. Your doctor has checked your overall health and may have suggested ways to take good care of yourself. He or she also may have recommended tests. At home, you can help prevent illness with healthy eating, regular exercise, and other steps. Follow-up care is a key part of your treatment and safety. Be sure to make and go to all appointments, and call your doctor if you are having problems. It's also a good idea to know your test results and keep a list of the medicines you take. How can you care for yourself at home? · Reach and stay at a healthy weight. This will lower your risk for many problems, such as obesity, diabetes, heart disease, and high blood pressure. · Get at least 30 minutes of physical activity on most days of the week. Walking is a good choice. You also may want to do other activities, such as running, swimming, cycling, or playing tennis or team sports. Discuss any changes in your exercise program with your doctor. · Do not smoke or allow others to smoke around you. If you need help quitting, talk to your doctor about stop-smoking programs and medicines. These can increase your chances of quitting for good. · Talk to your doctor about whether you have any risk factors for sexually transmitted infections (STIs). Having one sex partner (who does not have STIs and does not have sex with anyone else) is a good way to avoid these infections. · Use birth control if you do not want to have children at this time. Talk with your doctor about the choices available and what might be best for you. · Protect your skin from too much sun.  When you're outdoors from 10 a.m. to 4 p.m., stay in the shade or cover up with clothing and a hat with a wide brim. Wear sunglasses that block UV rays. Even when it's cloudy, put broad-spectrum sunscreen (SPF 30 or higher) on any exposed skin. · See a dentist one or two times a year for checkups and to have your teeth cleaned. · Wear a seat belt in the car. · Drink alcohol in moderation, if at all. That means no more than 2 drinks a day for men and 1 drink a day for women. Follow your doctor's advice about when to have certain tests. These tests can spot problems early. For everyone  · Cholesterol. Have the fat (cholesterol) in your blood tested after age 21. Your doctor will tell you how often to have this done based on your age, family history, or other things that can increase your risk for heart disease. · Blood pressure. Have your blood pressure checked during a routine doctor visit. Your doctor will tell you how often to check your blood pressure based on your age, your blood pressure results, and other factors. · Vision. Talk with your doctor about how often to have a glaucoma test.  · Diabetes. Ask your doctor whether you should have tests for diabetes. · Colon cancer. Have a test for colon cancer at age 48. You may have one of several tests. If you are younger than 48, you may need a test earlier if you have any risk factors. Risk factors include whether you already had a precancerous polyp removed from your colon or whether your parent, brother, sister, or child has had colon cancer. For women  · Breast exam and mammogram. Talk to your doctor about when you should have a clinical breast exam and a mammogram. Medical experts differ on whether and how often women under 50 should have these tests. Your doctor can help you decide what is right for you. · Pap test and pelvic exam. Begin Pap tests at age 24. A Pap test is the best way to find cervical cancer.  The test often is part of a pelvic exam. Ask how often to have this test.  · Tests for sexually transmitted infections (STIs). Ask whether you should have tests for STIs. You may be at risk if you have sex with more than one person, especially if your partners do not wear condoms. For men  · Tests for sexually transmitted infections (STIs). Ask whether you should have tests for STIs. You may be at risk if you have sex with more than one person, especially if you do not wear a condom. · Testicular cancer exam. Ask your doctor whether you should check your testicles regularly. · Prostate exam. Talk to your doctor about whether you should have a blood test (called a PSA test) for prostate cancer. Experts differ on whether and when men should have this test. Some experts suggest it if you are older than 39 and are -American or have a father or brother who got prostate cancer when he was younger than 72. When should you call for help? Watch closely for changes in your health, and be sure to contact your doctor if you have any problems or symptoms that concern you. Where can you learn more? Go to http://aissatou-yasir.info/. Enter P072 in the search box to learn more about \"Well Visit, Ages 25 to 48: Care Instructions. \"  Current as of: July 19, 2016  Content Version: 11.3  © 5686-4572 [employer]. Care instructions adapted under license by [employer] (which disclaims liability or warranty for this information). If you have questions about a medical condition or this instruction, always ask your healthcare professional. [place] [address] any warranty or liability for your use of this information.

## 2017-10-12 NOTE — PROGRESS NOTES
Rm#1  Refill inhaler   NON-FASTING   Chief Complaint   Patient presents with    Well Woman     had lots of dizzy spells when changed BP meds; has improved however stil gets dizzy when standing or bending over      1. Have you been to the ER, urgent care clinic since your last visit? Hospitalized since your last visit? No    2. Have you seen or consulted any other health care providers outside of the [address] [place] [address] since your last visit? Include any pap smears or colon screening.  No  Health Maintenance Due   Topic Date Due    Pneumococcal 19-64 Medium Risk (1 of 1 - PPSV23) 12/31/1989    PAP AKA CERVICAL CYTOLOGY  12/31/1991    INFLUENZA AGE 9 TO ADULT  08/01/2017     Pt wants flu vaccine &ppsv23  PAP  gyn [facility]   Polly reviewed

## 2017-11-06 ENCOUNTER — OFFICE VISIT (OUTPATIENT)
Dept: HEMATOLOGY | Age: 47
End: 2017-11-06

## 2017-11-06 VITALS
BODY MASS INDEX: 23.99 KG/M2 | SYSTOLIC BLOOD PRESSURE: 141 MMHG | TEMPERATURE: 96.2 F | OXYGEN SATURATION: 99 % | DIASTOLIC BLOOD PRESSURE: 93 MMHG | HEART RATE: 68 BPM | WEIGHT: 157.8 LBS

## 2017-11-06 DIAGNOSIS — R79.89 ELEVATED LFTS: Primary | ICD-10-CM

## 2017-11-06 RX ORDER — AA/PROT/LYSINE/METHIO/VIT C/B6 50-12.5 MG
TABLET ORAL
COMMUNITY

## 2017-11-06 NOTE — MR AVS SNAPSHOT
Visit Information Date & Time Provider Department Dept. Phone Encounter #  
 11/6/2017  9:15 AM Evan WALKERBriana Denver Goad, [address] [facility] 219 [id number] Your Appointments 11/27/2017  9:45 AM  
ROUTINE CARE with Keesha Rendon MD  
[facility]) Appt Note: 6 wk f/u med check [address] [facility] [address] [facility] [address]  
[place] [address] [facility] [address] Upcoming Health Maintenance Date Due  
 PAP AKA CERVICAL CYTOLOGY 12/31/1991 DTaP/Tdap/Td series (2 - Td) 6/1/2026 Allergies as of 11/6/2017  Review Complete On: 11/6/2017 By: Abdirizak Watts No Known Allergies Current Immunizations  Reviewed on 10/12/2017 Name Date Influenza Vaccine (Quad) PF 10/12/2017 11:51 AM, 9/17/2014 Pneumococcal Polysaccharide (PPSV-23) 10/12/2017 11:52 AM  
 Tdap 6/1/2016 Not reviewed this visit You Were Diagnosed With   
  
 Codes Comments Elevated LFTs    -  Primary ICD-10-CM: R79.89 ICD-9-CM: 790.6 Vitals BP Pulse Temp Weight(growth percentile) SpO2 BMI  
 (!) 141/93 68 96.2 °F (35.7 °C) (Oral) 157 lb 12.8 oz (71.6 kg) 99% 23.99 kg/m2 OB Status Smoking Status IUD Former Smoker BMI and BSA Data Body Mass Index Body Surface Area  
 23.99 kg/m 2 1.85 m 2 Your Updated Medication List  
  
   
This list is accurate as of: 11/6/17 10:01 AM.  Always use your most recent med list.  
  
  
  
  
 ACIDOPHILUS Cap Generic drug:  Lactobacillus acidophilus Take 2 Caps by mouth two (2) times a day. albuterol 90 mcg/actuation inhaler Commonly known as:  PROVENTIL HFA, VENTOLIN HFA, PROAIR HFA Take 2 Puffs by inhalation every four (4) hours as needed for Wheezing. atorvastatin 10 mg tablet Commonly known as:  LIPITOR Take 1 Tab by mouth daily. CO Q-10 10 mg Cap Generic drug:  coenzyme q10 Take  by mouth. folic acid 507 mcg tablet Take 1 Tab by mouth daily. levonorgestrel 20 mcg/24 hr (5 years) IUD Commonly known as:  MIRENA  
1 Each by IntraUTERine route once. metoprolol succinate 100 mg tablet Commonly known as:  TOPROL-XL Take 1 Tab by mouth daily. montelukast 10 mg tablet Commonly known as:  SINGULAIR Take 1 Tab by mouth daily. pantoprazole 40 mg tablet Commonly known as:  PROTONIX  
take 1 tablet by mouth once daily  
  
 sertraline 50 mg tablet Commonly known as:  ZOLOFT Take 1 Tab by mouth daily. thiamine 100 mg tablet Commonly known as:  B-1 Take 1 Tab by mouth daily. traZODone 50 mg tablet Commonly known as:  Uzma Juvenal Take 1 Tab by mouth nightly. valACYclovir 500 mg tablet Commonly known as:  VALTREX Take 1 Tab by mouth daily. Indications: GENITAL HERPES SIMPLEX  
  
 valsartan 80 mg tablet Commonly known as:  DIOVAN Take 1 Tab by mouth daily. We Performed the Following ACTIN (SMOOTH MUSCLE) ANTIBODY M9894721 CPT(R)] ALPHA-1-ANTITRYPSIN, TOTAL, PHENOTYPE [91091 CPT(R)] ZE, DIRECT, W/REFLEX [id number] CPT(R)] CERULOPLASMIN [id number] CPT(R)] FERRITIN [95311 CPT(R)] HEMOCHROMATOSIS MUTATION [[id number] Custom] IRON PROFILE [id number] CPT(R)] MITOCHONDRIAL M2 AB [id number] CPT(R)] Patient Instructions FIBROSCAN PATIENT INFORMATION What is Fibroscan:? 
 
Fibroscan is an ultrasound device that measures liver stiffness by sending a pulse of vibrations through the liver. This translated into an immediate result that can help your healthcare team determine the level of damage to the liver as well as monitor the condition of various liver diseases over time. Fibroscan is helpful in the evaluation of the following conditions: 
 
Chronic Hepatitis C Chronic Hepatitis B Fatty Liver Disease Alcohol Liver Disease Chronic Cholestatic Liver Diseases What happens During the Scan? Patients receiving this exam lie flat on an examination table and raise the right arm above the head. The skin over the right lower rib cage is exposed and the examiner locates the correct area to be scanned. The prove of the scanner is placed directly on the patient and triggered to start. This fells like a gentle flick against the skin and should not be uncomfortable. At least ten (10) readings are taken and the average is calculated to score the amount of liver stiffness or scar tissue. The exam should take 10-20 minutes. What do I need to do to prepare for the scan? Please do not eat or drink anything 2-4 hours  Before your Fibroscan. You should continue taking any prescribed medication and can take small sips of water or clear fluid to do so,  But avoid drinking large amounts of fluid. Please dress comfortably in clothes that will allow for easy access to the right side of the abdomen. Women are discouraged from wearing a dress on the day of the exam. 
 
Are there any special precautions? Patients who are pregnant or have an implantable device (for example, pacemaker or defibrillator) should not have this exam performed. Patients with a significant amount of fat tissue in the area the probe is pressed may be unable to have test performed. Introducing [facility]! [employer] introduces Gogobot patient portal. Now you can access parts of your medical record, email your doctor's office, and request medication refills online. 1. In your internet browser, go to [email or web address] 2. Click on the First Time User? Click Here link in the Sign In box. You will see the New Member Sign Up page. 3. Enter your Gogobot Access Code exactly as it appears below. You will not need to use this code after youve completed the sign-up process. If you do not sign up before the expiration date, you must request a new code. · Smartling Access Code: [email or web address] Expires: 11/21/2017  1:38 PM 
 
4. Enter the last four digits of your Social Security Number (xxxx) and Date of Birth (mm/dd/yyyy) as indicated and click Submit. You will be taken to the next sign-up page. 5. Create a Smartling ID. This will be your Smartling login ID and cannot be changed, so think of one that is secure and easy to remember. 6. Create a Smartling password. You can change your password at any time. 7. Enter your Password Reset Question and Answer. This can be used at a later time if you forget your password. 8. Enter your e-mail address. You will receive e-mail notification when new information is available in [address] 9. Click Sign Up. You can now view and download portions of your medical record. 10. Click the Download Summary menu link to download a portable copy of your medical information. If you have questions, please visit the Frequently Asked Questions section of the Smartling website. Remember, Smartling is NOT to be used for urgent needs. For medical emergencies, dial 911. Now available from your iPhone and Android! Please provide this summary of care documentation to your next provider. Your primary care clinician is listed as Pau Repine. If you have any questions after today's visit, please call [phone].

## 2017-11-06 NOTE — PROGRESS NOTES
[address], [address], [place], [place]       LARRY Borjas PA-C Martyn Kapur, ELIELP-BC   LARRY Sosa NP        at [place] [id number] [facility], [address] [address]     [phone]     FAX: [phone]    at [address], [address] [place] [address], [address]     [phone]     FAX: [phone]     Patient Care Team:  Bernice Gunter MD as PCP - General (Internal Medicine)  Arthur Jung MD (Gastroenterology)    Problem List  Date Reviewed: 10/31/2017          Codes Class Noted    Herpes simplex vulvovaginitis ICD-10-CM: A60.04  ICD-9-CM: 054.11  8/23/2017        Elevated liver enzymes ICD-10-CM: R74.8  ICD-9-CM: 790.5  5/25/2016        Alcohol dependence ([facility] [address] ICD-10-CM: F10.20  ICD-9-CM: 303.90  5/25/2016        Hypomagnesemia ICD-10-CM: E83.42  ICD-9-CM: 275.2  Unknown        Vitamin D deficiency ICD-10-CM: E55.9  ICD-9-CM: 268.9  3/18/2015        Crohn's disease - possible ICD-10-CM: K50.90  ICD-9-CM: 555.9  3/10/2015    Overview Signed 5/25/2016 11:16 AM by Zita Morrison MD     Patient reports GI did biopsy for ulcers in colon that was negative. Since changing diet in 2015 has not had recurrence of diarrhea nor abd pain. Other and unspecified hyperlipidemia ICD-10-CM: E78.5  ICD-9-CM: 272.4  6/23/2010        Allergic asthma ICD-10-CM: J45.909  ICD-9-CM: 493.00  9/28/2009        Essential hypertension, benign ICD-10-CM: I10  ICD-9-CM: 401.1  6/9/2009        Hemorrhoids ICD-10-CM: K64.9  ICD-9-CM: 455.6  6/9/2009        Anxiety state, unspecified ICD-10-CM: F41.1  ICD-9-CM: 300.00  6/9/2009            The physicians listed above have asked me to see Craig Sonal in consultation regarding elevated liver enzymes and its management.   All medical records sent by the referring physicians were reviewed The patient is a 55 y.o.  female who was first noted to have abnormalities in liver transaminases 9/2014. Serologic evaluation for markers of chronic liver disease were negative for HCV and HBV. Imaging of the liver was not performed. An assessment of liver fibrosis with biopsy or elastography has not been performed. The patient had not started any new medications within 3 months preceding the elevation in liver chemistries. The most recent laboratory studies indicate the liver transaminases are elevated, ALP is normal, tests of hepatic synthetic and metabolic function are normal, total bilirubin is normal, albumin is normal and the platelet count is normal.    The patient notes hematochezia. She states she was diagnosed with Crohn's but then he decided it was not. Her gastroenterologist is Dr. Lalo Barrientos. I have advised her to follow up again with his office. She thinks it is due to stress and notes marked improvement after stopping iced tea and adding probiotics. She was given Pentasa during flares. The patient completes all daily activities without any functional limitations. The patient has not experienced fatigue, pain in the right side over the liver, yellowing of the eyes or skin, problems concentrating or swelling of the lower extremities. ALLERGIES  No Known Allergies    MEDICATIONS  Current Outpatient Prescriptions   Medication Sig    coenzyme q10 (CO Q-10) 10 mg cap Take  by mouth.  valsartan (DIOVAN) 80 mg tablet Take 1 Tab by mouth daily.  sertraline (ZOLOFT) 50 mg tablet Take 1 Tab by mouth daily.  thiamine (B-1) 100 mg tablet Take 1 Tab by mouth daily.  folic acid 128 mcg tablet Take 1 Tab by mouth daily.  valACYclovir (VALTREX) 500 mg tablet Take 1 Tab by mouth daily.  Indications: GENITAL HERPES SIMPLEX    pantoprazole (PROTONIX) 40 mg tablet take 1 tablet by mouth once daily    traZODone (DESYREL) 50 mg tablet Take 1 Tab by mouth nightly.  atorvastatin (LIPITOR) 10 mg tablet Take 1 Tab by mouth daily.  metoprolol succinate (TOPROL-XL) 100 mg tablet Take 1 Tab by mouth daily.  levonorgestrel (MIRENA) 20 mcg/24 hr (5 years) IUD 1 Each by IntraUTERine route once.  Lactobacillus acidophilus (ACIDOPHILUS) cap Take 2 Caps by mouth two (2) times a day.  montelukast (SINGULAIR) 10 mg tablet Take 1 Tab by mouth daily.  albuterol (PROVENTIL HFA, VENTOLIN HFA, PROAIR HFA) 90 mcg/actuation inhaler Take 2 Puffs by inhalation every four (4) hours as needed for Wheezing. No current facility-administered medications for this visit. SYSTEM REVIEW NOT RELATED TO LIVER DISEASE OR REVIEWED ABOVE:  Constitution systems: Negative for fever, chills, weight gain. Intentional weight loss of 15 pounds. Eyes: Negative for visual changes. ENT: Negative for sore throat, painful swallowing. Respiratory: Negative for cough, hemoptysis, SOB. Cardiology: Negative for chest pain, palpitations. GI:  Negative for constipation or diarrhea. : Negative for urinary frequency, dysuria, hematuria, nocturia. Skin: Negative for rash. Hematology: Negative for easy bruising, blood clots. Musculo-skeletal: Negative for back pain, muscle pain, weakness. Neurologic: Negative for headaches, dizziness, vertigo, memory problems not related to HE. Psychology: Negative for anxiety, depression. FAMILY HISTORY:  The father  of gun accident. The mother has the following chronic diseases: HTN, obesity. There is no family history of liver disease. The patient has possible Crohn's disease. SOCIAL HISTORY:  The patient is . She has a long-term boyfriend. The patient has 1 child. The patient stopped using tobacco products in . The patient drinks 5 drinks/day. The patient currently works full time in  for industrial chemicals.     PHYSICAL EXAMINATION:  Visit Vitals    BP (!) 141/93    Pulse 68    Temp 96.2 °F (35.7 °C) (Oral)    Wt 157 lb 12.8 oz (71.6 kg)    SpO2 99%    BMI 23.99 kg/m2     General: No acute distress. Eyes: Sclera anicteric. ENT: No oral lesions. Nodes: No adenopathy. Skin: No spider angiomata. No jaundice. No palmar erythema. Respiratory: Lungs clear to auscultation. Cardiovascular: Regular heart rate. No murmurs. No JVD. Abdomen: Soft non-tender. Liver size normal to percussion/palpation. Spleen not palpable. No obvious ascites. Extremities: No edema. No muscle wasting. No gross arthritic changes. Neurologic: Alert and oriented. Cranial nerves grossly intact. No asterixis. LABORATORY STUDIES:  Liver [place] [address] 9/1/2017 5/24/2016   WBC 3.4 - 10.8 x10E3/uL 6.4 7.2   ANC 1.4 - 7.0 x10E3/uL 3.5 4.9   HGB 11.1 - 15.9 g/dL 13.8 14.4    - 379 x10E3/uL 213 230   AST 0 - 40 IU/L 88 (H) 75 (H)   ALT 0 - 32 IU/L 120 (H) 75 (H)   Alk Phos 39 - 117 IU/L 54 68   Bili, Total 0.0 - 1.2 mg/dL 0.8 0.9   Albumin 3.5 - 5.5 g/dL 4.9 4.7   BUN 6 - 24 mg/dL 10 12   Creat 0.57 - 1.00 mg/dL 0.92 0.80   Na 134 - 144 mmol/L 143 140   K 3.5 - 5.2 mmol/L 4.0 4.2   Cl 96 - 106 mmol/L 99 98   CO2 18 - 29 mmol/L 20 24   Glucose 65 - 99 mg/dL 99 105 (H)   Magnesium 1.6 - 2.6 mg/dL       SEROLOGIES:  HCV negative  HBV s Ag negative  HBV core Ab negative    LIVER HISTOLOGY:  Not available or performed    ENDOSCOPIC PROCEDURES:  Not available or performed    RADIOLOGY:  Not available or performed    OTHER TESTING:  Not available or performed    ASSESSMENT AND PLAN:  Persistent elevation in liver transaminases of unclear etiology at this time. Liver function is normal.  Total bilirubin is normal.  Serum albumin is normal. The platelet count is normal. Based upon laboratory studies the patient does not appear to have advanced liver disease or cirrhosis. Serologic testing to help define the cause of the laboratory abnormality were ordered. The most likely causes for the liver chemistry abnormalities were discussed with the patient and include immune liver disorders or alcohol abuse. Will perform imaging of the liver with ultrasound. The need to perform FibroScan to help assess for any liver damage due to the liver abnormalities was discussed. This will be performed at next visit. The patient was directed to continue all current medications at the current dosages. There are no contraindications for the patient to take any medications that are necessary for treatment of other medical issues. The patient was counseled regarding alcohol consumption. Vaccination for viral hepatitis B is recommended since the patient has no serologic evidence of previous exposure or vaccination with immunity. The need for vaccination against viral hepatitis A will be assessed with serologic and instituted as appropriate. All of the above issues were discussed with the patient. All questions were answered. The patient expressed a clear understanding of the above. [facility] [place] [address] in 2-4 weeks to review all data and determine the treatment plan.     Rudy Officer, [facility] Center[facility]-BC  Liver [place] of [id number] [place] [address], [facility]. [phone]

## 2017-11-07 LAB
ACTIN IGG SERPL-ACNC: 7 UNITS (ref 0–19)
ANA SER QL: NEGATIVE
CERULOPLASMIN SERPL-MCNC: 27.2 MG/DL (ref 19–39)
FERRITIN SERPL-MCNC: 93 NG/ML (ref 15–150)
HAV AB SER QL IA: NEGATIVE
IRON SATN MFR SERPL: 18 % (ref 15–55)
IRON SERPL-MCNC: 54 UG/DL (ref 27–159)
MITOCHONDRIA M2 IGG SER-ACNC: 12.3 UNITS (ref 0–20)
TIBC SERPL-MCNC: 301 UG/DL (ref 250–450)
UIBC SERPL-MCNC: 247 UG/DL (ref 131–425)

## 2017-11-08 LAB
A1AT PHENOTYP SERPL IFE: NORMAL
A1AT SERPL-MCNC: 121 MG/DL (ref 90–200)

## 2017-11-10 LAB — HFE GENE MUT ANL BLD/T: NORMAL

## 2017-11-16 NOTE — PROGRESS NOTES
Called pt and explained negative results. Since her BMI is normal, likely alcohol use that is driving the elevation. Advised to drastically cut back or stop alcohol use.

## 2017-11-22 ENCOUNTER — OFFICE VISIT (OUTPATIENT)
Dept: HEMATOLOGY | Age: 47
End: 2017-11-22

## 2017-11-22 VITALS
BODY MASS INDEX: 23.81 KG/M2 | WEIGHT: 156.6 LBS | DIASTOLIC BLOOD PRESSURE: 101 MMHG | SYSTOLIC BLOOD PRESSURE: 140 MMHG | HEART RATE: 73 BPM | OXYGEN SATURATION: 99 % | TEMPERATURE: 96.7 F

## 2017-11-22 DIAGNOSIS — R79.89 ELEVATED LFTS: Primary | ICD-10-CM

## 2017-11-22 NOTE — MR AVS SNAPSHOT
Visit Information Date & Time Provider Department Dept. Phone Encounter #  
 11/22/2017  9:30 AM Keon Hernandez Presser, [address] [facility] [id number] Your Appointments 11/27/2017  9:45 AM  
ROUTINE CARE with Navya Sampson MD  
[facility] Pediatric[facility] and Internal Medicine [address] [facility]) Appt Note: 6 wk f/u med check [address] [facility] [address] [facility] [address]  
[place] [address] [facility] [address] Upcoming Health Maintenance Date Due  
 PAP AKA CERVICAL CYTOLOGY 12/31/1991 DTaP/Tdap/Td series (2 - Td) 6/1/2026 Allergies as of 11/22/2017  Review Complete On: 11/22/2017 By: Kareem Meng No Known Allergies Current Immunizations  Reviewed on 10/12/2017 Name Date Influenza Vaccine (Quad) PF 10/12/2017 11:51 AM, 9/17/2014 Pneumococcal Polysaccharide (PPSV-23) 10/12/2017 11:52 AM  
 Tdap 6/1/2016 Not reviewed this visit You Were Diagnosed With   
  
 Codes Comments Elevated LFTs    -  Primary ICD-10-CM: R79.89 ICD-9-CM: 790.6 Vitals BP Pulse Temp Weight(growth percentile) SpO2 BMI  
 (!) 140/101 73 96.7 °F (35.9 °C) (Tympanic) 156 lb 9.6 oz (71 kg) 99% 23.81 kg/m2 OB Status Smoking Status IUD Former Smoker BMI and BSA Data Body Mass Index Body Surface Area  
 23.81 kg/m 2 1.85 m 2 Your Updated Medication List  
  
   
This list is accurate as of: 11/22/17  9:46 AM.  Always use your most recent med list.  
  
  
  
  
 ACIDOPHILUS Cap Generic drug:  Lactobacillus acidophilus Take 2 Caps by mouth two (2) times a day. albuterol 90 mcg/actuation inhaler Commonly known as:  PROVENTIL HFA, VENTOLIN HFA, PROAIR HFA Take 2 Puffs by inhalation every four (4) hours as needed for Wheezing. atorvastatin 10 mg tablet Commonly known as:  LIPITOR Take 1 Tab by mouth daily. CO Q-10 10 mg Cap Generic drug:  coenzyme q10 Take  by mouth. folic acid 529 mcg tablet Take 1 Tab by mouth daily. levonorgestrel 20 mcg/24 hr (5 years) Iud  
Commonly known as:  MIRENA  
1 Each by IntraUTERine route once. metoprolol succinate 100 mg tablet Commonly known as:  TOPROL-XL Take 1 Tab by mouth daily. montelukast 10 mg tablet Commonly known as:  SINGULAIR Take 1 Tab by mouth daily. pantoprazole 40 mg tablet Commonly known as:  PROTONIX  
take 1 tablet by mouth once daily  
  
 sertraline 50 mg tablet Commonly known as:  ZOLOFT Take 1 Tab by mouth daily. thiamine 100 mg tablet Commonly known as:  B-1 Take 1 Tab by mouth daily. traZODone 50 mg tablet Commonly known as:  Uzma Juvenal Take 1 Tab by mouth nightly. valACYclovir 500 mg tablet Commonly known as:  VALTREX Take 1 Tab by mouth daily. Indications: GENITAL HERPES SIMPLEX  
  
 valsartan 80 mg tablet Commonly known as:  DIOVAN Take 1 Tab by mouth daily. We Performed the Following CBC W/O DIFF [24152 CPT(R)] HEPATIC FUNCTION PANEL (6) [[id number] Custom] METABOLIC PANEL, BASIC [39029 CPT(R)] Introducing \[facility] [place] [facility]\"" & HEALTH SERVICES! [facility] introduces ZoopShop patient portal. Now you can access parts of your medical record, email your doctor's office, and request medication refills online. 1. In your internet browser, go to [email or web address] 2. Click on the First Time User? Click Here link in the Sign In box. You will see the New Member Sign Up page. 3. Enter your ZoopShop Access Code exactly as it appears below. You will not need to use this code after youve completed the sign-up process. If you do not sign up before the expiration date, you must request a new code. · ZoopShop Access Code: [email or web address] Expires: 2/20/2018  9:46 AM 
 
4.  Enter the last four digits of your Social Security Number (xxxx) and Date of Birth (mm/dd/yyyy) as indicated and click Submit. You will be taken to the next sign-up page. 5. Create a VoterTide ID. This will be your VoterTide login ID and cannot be changed, so think of one that is secure and easy to remember. 6. Create a VoterTide password. You can change your password at any time. 7. Enter your Password Reset Question and Answer. This can be used at a later time if you forget your password. 8. Enter your e-mail address. You will receive e-mail notification when new information is available in [address] 9. Click Sign Up. You can now view and download portions of your medical record. 10. Click the Download Summary menu link to download a portable copy of your medical information. If you have questions, please visit the Frequently Asked Questions section of the VoterTide website. Remember, VoterTide is NOT to be used for urgent needs. For medical emergencies, dial 911. Now available from your iPhone and Android! Please provide this summary of care documentation to your next provider. Your primary care clinician is listed as Diane Skelton. If you have any questions after today's visit, please call [phone].

## 2017-11-22 NOTE — PROGRESS NOTES
[address] [place], [address], [place], [place]       LARRY Tuttle PA-C Sheral Haas, [facility]-BC   LARRY Miles NP        at [address], [address] [address]     [phone]     FAX: [phone]    at [facility], [address] [place] [address], [address]     [phone]     FAX: [phone]     Patient Care Team:  Sangeetha Cartagena MD as PCP - General (Internal Medicine)  Mora Terrell MD (Gastroenterology)    Problem List  Date Reviewed: 10/31/2017          Codes Class Noted    Elevated LFTs ICD-10-CM: R79.89  ICD-9-CM: 790.6  11/6/2017        Herpes simplex vulvovaginitis ICD-10-CM: A60.04  ICD-9-CM: 054.11  8/23/2017        Elevated liver enzymes ICD-10-CM: R74.8  ICD-9-CM: 790.5  5/25/2016        Alcohol dependence ([facility] [address] ICD-10-CM: F10.20  ICD-9-CM: 303.90  5/25/2016        Hypomagnesemia ICD-10-CM: E83.42  ICD-9-CM: 275.2  Unknown        Vitamin D deficiency ICD-10-CM: E55.9  ICD-9-CM: 268.9  3/18/2015        Crohn's disease - possible ICD-10-CM: K50.90  ICD-9-CM: 555.9  3/10/2015    Overview Signed 5/25/2016 11:16 AM by Amita Alfonso MD     Patient reports GI did biopsy for ulcers in colon that was negative. Since changing diet in 2015 has not had recurrence of diarrhea nor abd pain. Other and unspecified hyperlipidemia ICD-10-CM: E78.5  ICD-9-CM: 272.4  6/23/2010        Allergic asthma ICD-10-CM: J45.909  ICD-9-CM: 493.00  9/28/2009        Essential hypertension, benign ICD-10-CM: I10  ICD-9-CM: 401.1  6/9/2009        Hemorrhoids ICD-10-CM: K64.9  ICD-9-CM: 455.6  6/9/2009        Anxiety state, unspecified ICD-10-CM: F41.1  ICD-9-CM: 300.00  6/9/2009            Amanda Padron Mike returns to the [address] for management of elevated liver enzymes.  The active problem list, all pertinent past medical history, medications and laboratory findings related to the liver disorder were reviewed with the patient. The patient is a 55 y.o.  female who was first noted to have abnormalities in liver transaminases 9/2014. Serologic evaluation for markers of chronic liver disease were all negative. Imaging of the liver was not performed. An assessment of liver fibrosis with elastography will be performed this visit. The most recent laboratory studies indicate the liver transaminases are elevated, ALP is normal, tests of hepatic synthetic and metabolic function are normal, total bilirubin is normal, albumin is normal and the platelet count is normal.    The patient completes all daily activities without any functional limitations. The patient has not experienced fatigue, pain in the right side over the liver, yellowing of the eyes or skin, problems concentrating or swelling of the lower extremities. ALLERGIES  No Known Allergies    MEDICATIONS  Current Outpatient Prescriptions   Medication Sig    coenzyme q10 (CO Q-10) 10 mg cap Take  by mouth.  valsartan (DIOVAN) 80 mg tablet Take 1 Tab by mouth daily.  sertraline (ZOLOFT) 50 mg tablet Take 1 Tab by mouth daily.  thiamine (B-1) 100 mg tablet Take 1 Tab by mouth daily.  folic acid 210 mcg tablet Take 1 Tab by mouth daily.  valACYclovir (VALTREX) 500 mg tablet Take 1 Tab by mouth daily. Indications: GENITAL HERPES SIMPLEX    pantoprazole (PROTONIX) 40 mg tablet take 1 tablet by mouth once daily    traZODone (DESYREL) 50 mg tablet Take 1 Tab by mouth nightly.  montelukast (SINGULAIR) 10 mg tablet Take 1 Tab by mouth daily.  atorvastatin (LIPITOR) 10 mg tablet Take 1 Tab by mouth daily.  metoprolol succinate (TOPROL-XL) 100 mg tablet Take 1 Tab by mouth daily.     albuterol (PROVENTIL HFA, VENTOLIN HFA, PROAIR HFA) 90 mcg/actuation inhaler Take 2 Puffs by inhalation every four (4) hours as needed for Wheezing.  levonorgestrel (MIRENA) 20 mcg/24 hr (5 years) IUD 1 Each by IntraUTERine route once.  Lactobacillus acidophilus (ACIDOPHILUS) cap Take 2 Caps by mouth two (2) times a day. No current facility-administered medications for this visit. SYSTEM REVIEW NOT RELATED TO LIVER DISEASE OR REVIEWED ABOVE:  Constitution systems: Negative for fever, chills, weight gain. Intentional weight loss of 15 pounds. Eyes: Negative for visual changes. ENT: Negative for sore throat, painful swallowing. Respiratory: Negative for cough, hemoptysis, SOB. Cardiology: Negative for chest pain, palpitations. GI:  Negative for constipation or diarrhea. : Negative for urinary frequency, dysuria, hematuria, nocturia. Skin: Negative for rash. Hematology: Negative for easy bruising, blood clots. Musculo-skeletal: Negative for back pain, muscle pain, weakness. Neurologic: Negative for headaches, dizziness, vertigo, memory problems not related to HE. Psychology: Negative for anxiety, depression. FAMILY HISTORY:  The father  of gun accident. The mother has the following chronic diseases: HTN, obesity. There is no family history of liver disease. The patient has possible Crohn's disease. SOCIAL HISTORY:  The patient is . She has a long-term boyfriend. The patient has 1 child. The patient stopped using tobacco products in . The patient drinks 5 drinks/day. The patient currently works full time in  for industrial chemicals. PHYSICAL EXAMINATION:  Visit Vitals    BP (!) 140/101    Pulse 73    Temp 96.7 °F (35.9 °C) (Tympanic)    Wt 156 lb 9.6 oz (71 kg)    SpO2 99%    BMI 23.81 kg/m2     General: No acute distress. Eyes: Sclera anicteric. ENT: No oral lesions. Nodes: No adenopathy. Skin: No spider angiomata. No jaundice. No palmar erythema. Respiratory: Lungs clear to auscultation. Cardiovascular: Regular heart rate.   No murmurs. No JVD. Abdomen: Soft non-tender. Liver size normal to percussion/palpation. Spleen not palpable. No obvious ascites. Extremities: No edema. No muscle wasting. No gross arthritic changes. Neurologic: Alert and oriented. Cranial nerves grossly intact. No asterixis. LABORATORY STUDIES:  Liver [place] of [address] Units 9/1/2017 5/24/2016   WBC 3.4 - 10.8 x10E3/uL 6.4 7.2   ANC 1.4 - 7.0 x10E3/uL 3.5 4.9   HGB 11.1 - 15.9 g/dL 13.8 14.4    - 379 x10E3/uL 213 230   AST 0 - 40 IU/L 88 (H) 75 (H)   ALT 0 - 32 IU/L 120 (H) 75 (H)   Alk Phos 39 - 117 IU/L 54 68   Bili, Total 0.0 - 1.2 mg/dL 0.8 0.9   Albumin 3.5 - 5.5 g/dL 4.9 4.7   BUN 6 - 24 mg/dL 10 12   Creat 0.57 - 1.00 mg/dL 0.92 0.80   Na 134 - 144 mmol/L 143 140   K 3.5 - 5.2 mmol/L 4.0 4.2   Cl 96 - 106 mmol/L 99 98   CO2 18 - 29 mmol/L 20 24   Glucose 65 - 99 mg/dL 99 105 (H)   Magnesium 1.6 - 2.6 mg/dL       SEROLOGIES:  Serologies Latest Ref Rng & Units 11/6/2017 5/24/2016   Hep A Ab, Total Negative Negative    Hep B Surface Ag Negative  Negative   Hep B Core Ab, Total Negative  Negative   Ferritin 15 - 150 ng/mL 93    Iron % Saturation 15 - 55 % 18    ZE Ab, Direct Negative Negative    ASMCA 0 - 19 Units 7    M2 Ab 0.0 - 20.0 Units 12.3    Ceruloplasmin 19.0 - 39.0 mg/dL 27.2    Alpha-1 antitrypsin level 90 - 200 mg/dL 121      LIVER HISTOLOGY:  11/2017. FibroScan performed at [address]. EkPa was 4.4. IQR/med 5%. The results suggested a fibrosis level of F0. ENDOSCOPIC PROCEDURES:  Not available or performed    RADIOLOGY:  Not available or performed    OTHER TESTING:  Not available or performed    ASSESSMENT AND PLAN:  Persistent elevation in liver enzymes. Will recheck labs today.        Liver function is normal.  Total bilirubin is normal.  Serum albumin is normal. The platelet count is normal. Based upon laboratory studies the patient does not appear to have advanced liver disease or cirrhosis. The most likely cause for the liver chemistry abnormalities was discussed with the patient and include alcohol abuse. The patient was directed to continue all current medications at the current dosages. There are no contraindications for the patient to take any medications that are necessary for treatment of other medical issues. The patient was counseled regarding alcohol consumption. Imaging of the liver by ultrasound was ordered. Hypertension: follow up with PCP. Vaccination for viral hepatitis B is recommended since the patient has no serologic evidence of previous exposure or vaccination with immunity. The need for vaccination against viral hepatitis A will be assessed with serologic and instituted as appropriate. All of the above issues were discussed with the patient. All questions were answered. The patient expressed a clear understanding of the above. [facility] 87 in 6 months to see how alcohol decrease is going.      CORTEZ Garner-BC  Liver [place] of [address], [address] [place]  [address]  22.  [phone]

## 2017-11-23 LAB
ALBUMIN SERPL-MCNC: 4.6 G/DL (ref 3.5–5.5)
ALP SERPL-CCNC: 55 IU/L (ref 39–117)
ALT SERPL-CCNC: 45 IU/L (ref 0–32)
AST SERPL-CCNC: 52 IU/L (ref 0–40)
BILIRUB DIRECT SERPL-MCNC: 0.2 MG/DL (ref 0–0.4)
BILIRUB SERPL-MCNC: 0.6 MG/DL (ref 0–1.2)
BUN SERPL-MCNC: 8 MG/DL (ref 6–24)
BUN/CREAT SERPL: 12 (ref 9–23)
CALCIUM SERPL-MCNC: 9.2 MG/DL (ref 8.7–10.2)
CHLORIDE SERPL-SCNC: 100 MMOL/L (ref 96–106)
CO2 SERPL-SCNC: 29 MMOL/L (ref 18–29)
CREAT SERPL-MCNC: 0.68 MG/DL (ref 0.57–1)
ERYTHROCYTE [DISTWIDTH] IN BLOOD BY AUTOMATED COUNT: 13.1 % (ref 12.3–15.4)
GFR SERPLBLD CREATININE-BSD FMLA CKD-EPI: 105 ML/MIN/1.73
GFR SERPLBLD CREATININE-BSD FMLA CKD-EPI: 121 ML/MIN/1.73
GLUCOSE SERPL-MCNC: 85 MG/DL (ref 65–99)
HCT VFR BLD AUTO: 41.4 % (ref 34–46.6)
HGB BLD-MCNC: 14.2 G/DL (ref 11.1–15.9)
MCH RBC QN AUTO: 34.1 PG (ref 26.6–33)
MCHC RBC AUTO-ENTMCNC: 34.3 G/DL (ref 31.5–35.7)
MCV RBC AUTO: 99 FL (ref 79–97)
PLATELET # BLD AUTO: 212 X10E3/UL (ref 150–379)
POTASSIUM SERPL-SCNC: 3.4 MMOL/L (ref 3.5–5.2)
RBC # BLD AUTO: 4.17 X10E6/UL (ref 3.77–5.28)
SODIUM SERPL-SCNC: 146 MMOL/L (ref 134–144)
WBC # BLD AUTO: 5.9 X10E3/UL (ref 3.4–10.8)

## 2017-12-01 NOTE — PROGRESS NOTES
Liver enzymes improving. Continue alcohol decrease/cessation. Advised pt of all of this over phone. [facility] has still not called her. She is going to call them and try to get it scheduled.

## 2018-01-05 DIAGNOSIS — F41.1 GAD (GENERALIZED ANXIETY DISORDER): ICD-10-CM

## 2018-01-05 RX ORDER — SERTRALINE HYDROCHLORIDE 50 MG/1
TABLET, FILM COATED ORAL
Qty: 90 TAB | Refills: 0 | Status: SHIPPED | OUTPATIENT
Start: 2018-01-05 | End: 2018-04-10 | Stop reason: SDUPTHER

## 2018-01-05 NOTE — TELEPHONE ENCOUNTER
Refilled Rx. For sertraline  Attempted to call patient. No answer. Left general message to call and schedule appointment to review of dose adjustment is needed.     Quentin Sorenson MD

## 2018-02-08 ENCOUNTER — DOCUMENTATION ONLY (OUTPATIENT)
Dept: INTERNAL MEDICINE CLINIC | Age: 48
End: 2018-02-08

## 2018-03-09 ENCOUNTER — TELEPHONE (OUTPATIENT)
Dept: INTERNAL MEDICINE CLINIC | Age: 48
End: 2018-03-09

## 2018-03-09 DIAGNOSIS — K29.50 CHRONIC GASTRITIS WITHOUT BLEEDING, UNSPECIFIED GASTRITIS TYPE: Primary | ICD-10-CM

## 2018-03-09 RX ORDER — OMEPRAZOLE 40 MG/1
40 CAPSULE, DELAYED RELEASE ORAL DAILY
Qty: 90 CAP | Refills: 3 | Status: SHIPPED | OUTPATIENT
Start: 2018-03-09 | End: 2018-03-28

## 2018-03-09 NOTE — TELEPHONE ENCOUNTER
Called and advised patient that pantoprazole 40 non-formulary. Will try change to omeprazole 40.  She will also schedule appointment to come in.     Uzair Kellogg MD

## 2018-03-28 ENCOUNTER — OFFICE VISIT (OUTPATIENT)
Dept: INTERNAL MEDICINE CLINIC | Age: 48
End: 2018-03-28

## 2018-03-28 VITALS
OXYGEN SATURATION: 99 % | WEIGHT: 154 LBS | HEART RATE: 69 BPM | BODY MASS INDEX: 23.34 KG/M2 | DIASTOLIC BLOOD PRESSURE: 88 MMHG | HEIGHT: 68 IN | SYSTOLIC BLOOD PRESSURE: 138 MMHG | RESPIRATION RATE: 16 BRPM | TEMPERATURE: 97.2 F

## 2018-03-28 DIAGNOSIS — K21.9 GASTROESOPHAGEAL REFLUX DISEASE WITHOUT ESOPHAGITIS: ICD-10-CM

## 2018-03-28 DIAGNOSIS — E78.5 HYPERLIPIDEMIA, UNSPECIFIED HYPERLIPIDEMIA TYPE: ICD-10-CM

## 2018-03-28 DIAGNOSIS — F41.1 GAD (GENERALIZED ANXIETY DISORDER): ICD-10-CM

## 2018-03-28 DIAGNOSIS — R79.89 ELEVATED LFTS: ICD-10-CM

## 2018-03-28 DIAGNOSIS — F10.20 UNCOMPLICATED ALCOHOL DEPENDENCE (HCC): ICD-10-CM

## 2018-03-28 DIAGNOSIS — I10 ESSENTIAL HYPERTENSION, BENIGN: Primary | ICD-10-CM

## 2018-03-28 RX ORDER — PANTOPRAZOLE SODIUM 40 MG/1
40 TABLET, DELAYED RELEASE ORAL DAILY
Qty: 90 TAB | Refills: 3 | Status: SHIPPED | OUTPATIENT
Start: 2018-03-28 | End: 2019-03-30 | Stop reason: SDUPTHER

## 2018-03-28 RX ORDER — PANTOPRAZOLE SODIUM 40 MG/1
40 TABLET, DELAYED RELEASE ORAL
Refills: 0 | COMMUNITY
Start: 2018-03-21 | End: 2018-03-28 | Stop reason: SDUPTHER

## 2018-03-28 NOTE — PROGRESS NOTES
HPI   Nina Pak is a 52 y.o. female, she presents today for:    Follow-up. Notes that she missed her last follow-up was     Is taking pantoprazole still. Taking valsartan and metoprolol. No dizziness. Continues to cut back on alcohol. Not drinking every day. Has \"willpower\" and is thinking of buying literature. Not currently interested in working with AA. Has follow-up with hepatology     Reports she would like to stop cholesterol medication. PMH/PSH: reviewed and updated  Sochx:  reports that she quit smoking about 6 years ago. She smoked 0.25 packs per day. She has never used smokeless tobacco. She reports that she drinks about 9.0 oz of alcohol per week  She reports that she does not use illicit drugs. Famhx: reviewed and updated     All: No Known Allergies  Med:   Current Outpatient Prescriptions   Medication Sig    pantoprazole (PROTONIX) 40 mg tablet 40 mg.    sertraline (ZOLOFT) 50 mg tablet take 1 tablet by mouth once daily    coenzyme q10 (CO Q-10) 10 mg cap Take  by mouth.  valsartan (DIOVAN) 80 mg tablet Take 1 Tab by mouth daily.  thiamine (B-1) 100 mg tablet Take 1 Tab by mouth daily.  folic acid 747 mcg tablet Take 1 Tab by mouth daily.  valACYclovir (VALTREX) 500 mg tablet Take 1 Tab by mouth daily. Indications: GENITAL HERPES SIMPLEX    traZODone (DESYREL) 50 mg tablet Take 1 Tab by mouth nightly.  montelukast (SINGULAIR) 10 mg tablet Take 1 Tab by mouth daily.  atorvastatin (LIPITOR) 10 mg tablet Take 1 Tab by mouth daily.  metoprolol succinate (TOPROL-XL) 100 mg tablet Take 1 Tab by mouth daily.  levonorgestrel (MIRENA) 20 mcg/24 hr (5 years) IUD 1 Each by IntraUTERine route once.  Lactobacillus acidophilus (ACIDOPHILUS) cap Take 2 Caps by mouth two (2) times a day.  omeprazole (PRILOSEC) 40 mg capsule Take 1 Cap by mouth daily.     albuterol (PROVENTIL HFA, VENTOLIN HFA, PROAIR HFA) 90 mcg/actuation inhaler Take 2 Puffs by inhalation every four (4) hours as needed for Wheezing. No current facility-administered medications for this visit. Review of Systems   Constitutional: Negative for chills, fever and malaise/fatigue. Respiratory: Negative for shortness of breath. Cardiovascular: Negative for chest pain. PE:  Blood pressure 143/89, pulse 69, temperature 97.2 °F (36.2 °C), temperature source Oral, resp. rate 16, height 5' 8\" (1.727 m), weight 154 lb (69.9 kg), SpO2 99 %. Body mass index is 23.42 kg/(m^2). Physical Exam   Constitutional: She is oriented to person, place, and time. She appears well-developed and well-nourished. No distress. HENT:   Head: Normocephalic. Mouth/Throat: Oropharynx is clear and moist.   Eyes: Conjunctivae and EOM are normal.   Neck: Neck supple. Cardiovascular: Normal rate, regular rhythm and normal heart sounds. Pulmonary/Chest: Effort normal and breath sounds normal.   Neurological: She is alert and oriented to person, place, and time. Skin: Skin is warm and dry. Nursing note and vitals reviewed. Labs:   No results found for any visits on 03/28/18. A/P:  52 y.o. female    ICD-10-CM ICD-9-CM    1. Essential hypertension, benign I10 401.1    2. DANNY (generalized anxiety disorder) F41.1 300.02    3. Alcohol dependence ([facility] F10.20 303.90    4. Elevated LFTs R79.89 790.6    5. Hyperlipidemia, unspecified hyperlipidemia type E78.5 272.4 LIPID PANEL   6. Gastroesophageal reflux disease without esophagitis K21.9 530.81 pantoprazole (PROTONIX) 40 mg tablet     HTN bp bordeline controlled. Continue to focus on supporting a choice to stop drinking and encouraging supportive resources    Following with liver team for elevated LFTs. HLD: lipid panel requested. - She was given AVS and expressed understanding with the diagnosis and plan as discussed.   Follow-up Disposition:  Return in about 2 months (around 5/28/2018) for fasting labs, then follow-up with MD to discuss cholesterol off medication. Future Appointments  Date Time Provider Bee Joshua   5/21/2018 9:15 AM Katharine Brown, LARRY [address] [facility]

## 2018-03-28 NOTE — PROGRESS NOTES
Rm#3  Chief Complaint   Patient presents with    Hypertension     f/u     Cholesterol Problem     f/u      1. Have you been to the ER, urgent care clinic since your last visit? Hospitalized since your last visit? No    2. Have you seen or consulted any other health care providers outside of the [address] [place] [address] since your last visit? Include any pap smears or colon screening.  Yes Dr. Jaylon Clark [facility]  and mammo   Health Maintenance Due   Topic Date Due    PAP AKA CERVICAL CYTOLOGY  12/31/1991     Learning Assessment 11/22/2017   PRIMARY LEARNER Patient   HIGHEST LEVEL OF EDUCATION - PRIMARY LEARNER  -   BARRIERS PRIMARY LEARNER -   CO-LEARNER CAREGIVER -   PRIMARY LANGUAGE ENGLISH   LEARNER PREFERENCE PRIMARY LISTENING   ANSWERED BY patient   RELATIONSHIP SELF     PHQ over the last two weeks 3/28/2018   Little interest or pleasure in doing things Not at all   Feeling down, depressed or hopeless Not at all   Total Score PHQ 2 0

## 2018-03-28 NOTE — MR AVS SNAPSHOT
[address]  [address] [place] [address] 
[phone] Patient: Theresia Soulier MRN:  [id number] Visit Information Date & Time Provider Department Dept. Phone Encounter #  
 3/28/2018  1:45 PM David Acosta MD [address] [facility] [phone] [id number] Follow-up Instructions Return in about 2 months (around 5/28/2018) for fasting labs, then follow-up with MD to discuss cholesterol off medication. Your Appointments 5/21/2018  9:15 AM  
Follow Up with Von Alfonso NP [address] ([facility]) Appt Note: Follow up [facility] [phone] [facility] [address]  
[address] [place] [address] Upcoming Health Maintenance Date Due  
 PAP AKA CERVICAL CYTOLOGY 12/31/1991 DTaP/Tdap/Td series (2 - Td) 6/1/2026 Allergies as of 3/28/2018  Review Complete On: 3/28/2018 By: Andrew Mahmood LPN No Known Allergies Current Immunizations  Reviewed on 10/12/2017 Name Date Influenza Vaccine (Quad) PF 10/12/2017 11:51 AM, 9/17/2014 Pneumococcal Polysaccharide (PPSV-23) 10/12/2017 11:52 AM  
 Tdap 6/1/2016 Not reviewed this visit You Were Diagnosed With   
  
 Codes Comments Essential hypertension, benign    -  Primary ICD-10-CM: I10 
ICD-9-CM: 401.1 DANNY (generalized anxiety disorder)     ICD-10-CM: F41.1 ICD-9-CM: 300.02 Uncomplicated alcohol dependence ([address]     ICD-10-CM: F10.20 ICD-9-CM: 303.90 Elevated LFTs     ICD-10-CM: R79.89 ICD-9-CM: 790.6 Hyperlipidemia, unspecified hyperlipidemia type     ICD-10-CM: E78.5 ICD-9-CM: 272.4 Gastroesophageal reflux disease without esophagitis     ICD-10-CM: K21.9 ICD-9-CM: 530.81 Vitals BP Pulse Temp Resp Height(growth percentile) Weight(growth percentile) 138/88 69 97.2 °F (36.2 °C) (Oral) 16 5' 8\" (1.727 m) 154 lb (69.9 kg) SpO2 BMI OB Status Smoking Status 99% 23.42 kg/m2 IUD Former Smoker Vitals History BMI and BSA Data Body Mass Index Body Surface Area  
 23.42 kg/m 2 1.83 m 2 Preferred Pharmacy Pharmacy Name Phone [facility] [address] [phone] Your Updated Medication List  
  
   
This list is accurate as of 3/28/18  2:27 PM.  Always use your most recent med list.  
  
  
  
  
 ACIDOPHILUS Cap Generic drug:  Lactobacillus acidophilus Take 2 Caps by mouth two (2) times a day. albuterol 90 mcg/actuation inhaler Commonly known as:  PROVENTIL HFA, VENTOLIN HFA, PROAIR HFA Take 2 Puffs by inhalation every four (4) hours as needed for Wheezing. atorvastatin 10 mg tablet Commonly known as:  LIPITOR Take 1 Tab by mouth daily. CO Q-10 10 mg Cap Generic drug:  coenzyme q10 Take  by mouth. folic acid 193 mcg tablet Take 1 Tab by mouth daily. levonorgestrel 20 mcg/24 hr (5 years) Iud  
Commonly known as:  MIRENA  
1 Each by IntraUTERine route once. metoprolol succinate 100 mg tablet Commonly known as:  TOPROL-XL Take 1 Tab by mouth daily. montelukast 10 mg tablet Commonly known as:  SINGULAIR Take 1 Tab by mouth daily. pantoprazole 40 mg tablet Commonly known as:  PROTONIX Take 1 Tab by mouth daily. sertraline 50 mg tablet Commonly known as:  ZOLOFT  
take 1 tablet by mouth once daily  
  
 thiamine 100 mg tablet Commonly known as:  B-1 Take 1 Tab by mouth daily. traZODone 50 mg tablet Commonly known as:  [place] Odor Take 1 Tab by mouth nightly. valACYclovir 500 mg tablet Commonly known as:  VALTREX Take 1 Tab by mouth daily. Indications: GENITAL HERPES SIMPLEX  
  
 valsartan 80 mg tablet Commonly known as:  DIOVAN Take 1 Tab by mouth daily. Prescriptions Sent to Pharmacy Refills  
 pantoprazole (PROTONIX) 40 mg tablet 3 Sig: Take 1 Tab by mouth daily. Class: Normal  
 Pharmacy: [id number] [address], [address] #: [phone] Route: Oral  
  
Follow-up Instructions Return in about 2 months (around 5/28/2018) for fasting labs, then follow-up with MD to discuss cholesterol off medication. To-Do List   
 Around 04/28/2018 Lab:  LIPID PANEL Patient Instructions Work-Life Balance: Care Instructions Your Care Instructions Do you ever feel like there is not enough time to do all of the things you have to do, and no time at all for the things you enjoy? If so, you are not alone. On average, people in the [place] have worked more and more hours each year since 1970. But in recent years, fewer people say they want to take on more at work, even if they would get promoted or get paid more money. More and more workers say they want time to spend with their families and to do things that are important to them. Do you ever feel: · That you always have more and more work to do at your job? · That too many people depend on you every day? · That you never have enough time for your family or friends? · That you never have time for hobbies or things you enjoy? · That each second of your day is scheduled? If you answered \"yes\" to any of these questions, take steps at work and at home to get your life into balance. Follow-up care is a key part of your treatment and safety. Be sure to make and go to all appointments, and call your doctor if you are having problems. How can you care for yourself at home? Manage your time · Focus on the important things. Taking on too much can wear you out. Look at how you spend your time, and redirect your focus. Learn to say \"no\" and let go of things that do not matter. · Set one small goal at a time. Use a day planner. Break large projects into smaller ones. · Ask for help. Let your children, your spouse, your coworkers, and other people in your life help you get things done. · Leave your job at the office. If you give up free time to get more work done, you may pay for it with stress. If your job offers a flexible work schedule, use it to fit your own work style. For instance, come in earlier to have a longer lunch break, or make time for a yoga class or workout during your workday. · Unplug. Do not let technology (such as your cell phone or the Internet) erase the line between your time and your employer's time. Lower job stress Job stress causes trouble at work and at home. At work, you may worry about things you have not had time to do at home. At home, you may worry about your job. This cycle upsets your work-life balance. Lowering your job stress can get your life back in balance. Job stress can be caused by: · Pressure and deadlines. · Heavy workloads or long hours. · Not being allowed to make decisions. · Health and safety hazards. · Feeling you may lose your job. · Unclear or changing job duties. · Too much responsibility. · Work that is very tiring or boring. Do any of these things bother you? Consider talking with your boss to change things. There are some things that you may not be able to control. But even a few small changes might help lower your stress. Take advantage of programs at work Businesses make money and are better off in other ways if their employees are healthy and happy. For this reason, many companies have programs to help balance work life and home life. These programs may include: · Flexible schedules and hours. · Time off for family reasons, education, or community service. · Being able to work from home. · Employee assistance programs to provide counseling. · Child-care programs. Check to see if your company has any of these or other programs that could help you. If not, consider talking to your boss about why work-life balance programs make good business sense. Even if your company does not start an official program, you may be able to get flexible hours, time off, or the ability to do some work from home. Know when to quit If you are truly unhappy because of a stressful job, and if the suggestions here have not worked, it may be time to think about changing jobs or changing careers. But before you quit, take time to research your options. Where can you learn more? Go to http://aissatou-yasir.info/. Enter W567 in the search box to learn more about \"Work-Life Balance: Care Instructions. \" Current as of: July 26, 2016 Content Version: 11.4 © 7973-1875 Healthwise, Incorporated. Care instructions adapted under license by [employer] (which disclaims liability or warranty for this information). If you have questions about a medical condition or this instruction, always ask your healthcare professional. [address] any warranty or liability for your use of this information. Introducing [facility]! [facility] introduces Attune patient portal. Now you can access parts of your medical record, email your doctor's office, and request medication refills online. 1. In your internet browser, go to [email or web address]. [employer] 2. Click on the First Time User? Click Here link in the Sign In box. You will see the New Member Sign Up page. 3. Enter your Attune Access Code exactly as it appears below. You will not need to use this code after youve completed the sign-up process. If you do not sign up before the expiration date, you must request a new code. · Attune Access Code: [email or web address] Expires: 6/26/2018  1:49 PM 
 
4.  Enter the last four digits of your Social Security Number (xxxx) and Date of Birth (mm/dd/yyyy) as indicated and click Submit. You will be taken to the next sign-up page. 5. Create a [employer] ID. This will be your [employer] login ID and cannot be changed, so think of one that is secure and easy to remember. 6. Create a [employer] password. You can change your password at any time. 7. Enter your Password Reset Question and Answer. This can be used at a later time if you forget your password. 8. Enter your e-mail address. You will receive e-mail notification when new information is available in [address] 9. Click Sign Up. You can now view and download portions of your medical record. 10. Click the Download Summary menu link to download a portable copy of your medical information. If you have questions, please visit the Frequently Asked Questions section of the [employer] website. Remember, [employer] is NOT to be used for urgent needs. For medical emergencies, dial 911. Now available from your iPhone and Android! Please provide this summary of care documentation to your next provider. Your primary care clinician is listed as Shakeel Clay. If you have any questions after today's visit, please call [phone].

## 2018-04-10 DIAGNOSIS — I10 ESSENTIAL HYPERTENSION, BENIGN: ICD-10-CM

## 2018-04-10 DIAGNOSIS — F41.1 GAD (GENERALIZED ANXIETY DISORDER): ICD-10-CM

## 2018-04-11 RX ORDER — SERTRALINE HYDROCHLORIDE 50 MG/1
TABLET, FILM COATED ORAL
Qty: 90 TAB | Refills: 3 | Status: SHIPPED | OUTPATIENT
Start: 2018-04-11 | End: 2019-04-18 | Stop reason: SDUPTHER

## 2018-04-11 RX ORDER — VALSARTAN 80 MG/1
TABLET ORAL
Qty: 90 TAB | Refills: 3 | Status: SHIPPED | OUTPATIENT
Start: 2018-04-11 | End: 2018-07-25 | Stop reason: SDUPTHER

## 2018-08-16 DIAGNOSIS — G47.00 INSOMNIA, UNSPECIFIED TYPE: ICD-10-CM

## 2018-08-16 DIAGNOSIS — I10 ESSENTIAL HYPERTENSION, BENIGN: ICD-10-CM

## 2018-08-16 DIAGNOSIS — E78.5 HYPERLIPIDEMIA, UNSPECIFIED HYPERLIPIDEMIA TYPE: ICD-10-CM

## 2018-08-16 DIAGNOSIS — A60.00 GENITAL HERPES SIMPLEX, UNSPECIFIED SITE: ICD-10-CM

## 2018-08-16 DIAGNOSIS — J45.30 ALLERGIC ASTHMA, MILD PERSISTENT, UNCOMPLICATED: ICD-10-CM

## 2018-08-17 RX ORDER — METOPROLOL SUCCINATE 100 MG/1
TABLET, EXTENDED RELEASE ORAL
Qty: 90 TAB | Refills: 3 | Status: SHIPPED | OUTPATIENT
Start: 2018-08-17 | End: 2019-08-17 | Stop reason: SDUPTHER

## 2018-08-17 RX ORDER — TRAZODONE HYDROCHLORIDE 50 MG/1
TABLET ORAL
Qty: 90 TAB | Refills: 3 | Status: SHIPPED | OUTPATIENT
Start: 2018-08-17 | End: 2019-08-17 | Stop reason: SDUPTHER

## 2018-08-17 RX ORDER — MONTELUKAST SODIUM 10 MG/1
10 TABLET ORAL DAILY
Qty: 90 TAB | Refills: 5 | Status: SHIPPED | OUTPATIENT
Start: 2018-08-17 | End: 2020-03-26 | Stop reason: SDUPTHER

## 2018-08-17 RX ORDER — VALACYCLOVIR HYDROCHLORIDE 500 MG/1
500 TABLET, FILM COATED ORAL DAILY
Qty: 90 TAB | Refills: 3 | Status: SHIPPED | OUTPATIENT
Start: 2018-08-17 | End: 2019-09-15 | Stop reason: SDUPTHER

## 2018-08-17 RX ORDER — ATORVASTATIN CALCIUM 10 MG/1
10 TABLET, FILM COATED ORAL DAILY
Qty: 90 TAB | Refills: 3 | Status: SHIPPED | OUTPATIENT
Start: 2018-08-17 | End: 2019-08-17 | Stop reason: SDUPTHER

## 2018-10-29 ENCOUNTER — OFFICE VISIT (OUTPATIENT)
Dept: INTERNAL MEDICINE CLINIC | Age: 48
End: 2018-10-29

## 2018-10-29 VITALS
DIASTOLIC BLOOD PRESSURE: 89 MMHG | TEMPERATURE: 97.5 F | HEIGHT: 68 IN | RESPIRATION RATE: 16 BRPM | HEART RATE: 68 BPM | BODY MASS INDEX: 23.68 KG/M2 | OXYGEN SATURATION: 100 % | SYSTOLIC BLOOD PRESSURE: 133 MMHG | WEIGHT: 156.25 LBS

## 2018-10-29 DIAGNOSIS — Z00.00 WELL WOMAN EXAM (NO GYNECOLOGICAL EXAM): Primary | ICD-10-CM

## 2018-10-29 DIAGNOSIS — Z23 ENCOUNTER FOR IMMUNIZATION: ICD-10-CM

## 2018-10-29 DIAGNOSIS — J45.20 ALLERGIC ASTHMA, MILD INTERMITTENT, UNCOMPLICATED: ICD-10-CM

## 2018-10-29 DIAGNOSIS — Z87.19 HX OF CROHN'S DISEASE: ICD-10-CM

## 2018-10-29 RX ORDER — ALBUTEROL SULFATE 90 UG/1
2 AEROSOL, METERED RESPIRATORY (INHALATION)
Qty: 1 INHALER | Refills: 1 | Status: SHIPPED | OUTPATIENT
Start: 2018-10-29 | End: 2019-11-05 | Stop reason: SDUPTHER

## 2018-10-29 NOTE — PROGRESS NOTES
History of Present Illness:  
Amanda Alexandre is a 52 y.o. female here for evaluation: Chief Complaint Patient presents with  Complete Physical  
  request refill on Albuterol inhaler Here for physical. 
 
Last with Dr. Archie Mcnair Oct 2017. Prior current with health maintenance and updated with visit then. Elev LFT's: 
She had discussions prior with Dr. Archie Mcnair about alcohol use and elevated LFT's. Had eval and follow-up with hepatology also--missed appt there May 2018--last seen Nov 2017. Had US ordered there but not done. Prior she had been working to decrease alcohol use without AA. Had friend who was sober and supportive. She is drinking less--only 3 days a week. Drinks beer (prior mixed drinks). She is spending more time with friend who does not drink as well. She is not planning to stop drinking currently. Monitoring and follow-up reviewed. She is not planning/needing to follow-up with hepatology. Other hepatitis lab eval there normal including hemochromatosis genetic testing, alpha-1-antitrypsin, ferritin/iron panel, ASMA, ceruloplasmin, ZE. She was Hep C negative; Hep A & B negative. With same partner. Not interested in vaccines at this time for Hep B or A--reviewed at visit. Plans here when interested. Physical: 
Biometric form reviewed at visit. She is fasting for labs. Had prior colonoscopy with GI provider when managed for Crohn's. Notes interested in monitoring with labs today. She is not having diarrhea or bleeding with diet changes, and not followed by GI at this time for mgt. Reviewed if labs abnormal, would need to see GI again. Reviewed fasting labs today. Interested in A1c. Last prior here 5.4. Influenza received today. Tdap and other vaccines current. Has gyn provider--prior breast biopsies and last seen March 2018 per pt. Yearly visits currently. Mammograms only for breast imaging per pt. Prior to Admission medications Medication Sig Start Date End Date Taking? Authorizing Provider  
traZODone (DESYREL) 50 mg tablet take 1 tablet by mouth NIGHTLY 8/17/18  Yes Cayla Reece MD  
metoprolol succinate (TOPROL-XL) 100 mg tablet take 1 tablet by mouth once daily 8/17/18  Yes Cayla Reece MD  
valACYclovir (VALTREX) 500 mg tablet Take 1 Tab by mouth daily. Indications: genital herpes simplex Patient taking differently: Take 250 mg by mouth daily. 8/17/18  Yes Cayla Reece MD  
montelukast (SINGULAIR) 10 mg tablet Take 1 Tab by mouth daily. 8/17/18  Yes Cayla Reece MD  
losartan (COZAAR) 25 mg tablet Take 1 Tab by mouth daily. 7/25/18  Yes Cayla Reece MD  
pantoprazole (PROTONIX) 40 mg tablet Take 1 Tab by mouth daily. 3/28/18  Yes Cayla Reece MD  
levonorgestrel (MIRENA) 20 mcg/24 hr (5 years) IUD 1 Each by IntraUTERine route once. Yes Provider, Historical  
atorvastatin (LIPITOR) 10 mg tablet Take 1 Tab by mouth daily. 8/17/18   Cayla Reece MD  
sertraline (ZOLOFT) 50 mg tablet take 1 tablet by mouth once daily 4/11/18   Cayla Reece MD  
coenzyme q10 (CO Q-10) 10 mg cap Take  by mouth. Provider, Historical  
thiamine (B-1) 100 mg tablet Take 1 Tab by mouth daily. 9/6/17   Cayla Reece MD  
folic acid 634 mcg tablet Take 1 Tab by mouth daily. 9/6/17   Cayla Reece MD  
albuterol (PROVENTIL HFA, VENTOLIN HFA, PROAIR HFA) 90 mcg/actuation inhaler Take 2 Puffs by inhalation every four (4) hours as needed for Wheezing. 6/1/16   Cayla Reece MD  
Lactobacillus acidophilus (ACIDOPHILUS) cap Take 2 Caps by mouth two (2) times a day. Provider, Historical  
  
 
ROS Vitals:  
 10/29/18 0915 BP: 133/89 Pulse: 68 Resp: 16 Temp: 97.5 °F (36.4 °C) TempSrc: Oral  
SpO2: 100% Weight: 156 lb 4 oz (70.9 kg) Height: 5' 8\" (1.727 m) PainSc:   0 - No pain Body mass index is 23.76 kg/m². Physical Exam:  
 
Physical Exam  
Constitutional: She appears well-developed and well-nourished. No distress. HENT:  
Head: Normocephalic and atraumatic. Right Ear: External ear normal.  
Left Ear: External ear normal.  
Nose: Nose normal.  
Mouth/Throat: Oropharynx is clear and moist. No oropharyngeal exudate. TM's normal bilat. Eyes: Conjunctivae are normal. Right eye exhibits no discharge. Left eye exhibits no discharge. No scleral icterus. LImited undilated fundoscopic exam normal bilaterally. Neck: Normal range of motion. Neck supple. No tracheal deviation present. No thyromegaly present. Cardiovascular: Normal rate, regular rhythm, normal heart sounds and intact distal pulses. Exam reveals no gallop and no friction rub. No murmur heard. Pulmonary/Chest: Effort normal and breath sounds normal. No stridor. No respiratory distress. She has no wheezes. She has no rales. She exhibits no tenderness. Abdominal: Soft. Bowel sounds are normal. She exhibits no distension. There is no tenderness. There is no rebound and no guarding. Musculoskeletal: She exhibits no edema, tenderness or deformity. Lymphadenopathy:  
  She has no cervical adenopathy. Neurological: She is alert. She exhibits normal muscle tone. Coordination normal.  
Skin: Skin is warm. No rash noted. She is not diaphoretic. No erythema. No pallor. Psychiatric: She has a normal mood and affect. Her behavior is normal. Judgment and thought content normal.  
 
 
Assessment and Plan: ICD-10-CM ICD-9-CM 1. Well woman exam (no gynecological exam) Z00.00 V70.0 CBC WITH AUTOMATED DIFF  
   METABOLIC PANEL, COMPREHENSIVE  
   LIPID PANEL  
   HEMOGLOBIN A1C WITH EAG 2. Allergic asthma, mild intermittent, uncomplicated R89.30 318.07 albuterol (PROVENTIL HFA, VENTOLIN HFA, PROAIR HFA) 90 mcg/actuation inhaler 3.  Encounter for immunization Z23 V03.89 INFLUENZA VIRUS VAC QUAD,SPLIT,PRESV FREE SYRINGE IM  
 4. Hx of Crohn's disease Z87.19 V12.70 SED RATE (ESR) C REACTIVE PROTEIN, QT  
 
 
1. Fasting labs today. Needs biometric by 10/31--reviewed at visit. Gyn/breast care through gynecology--yearly per pt and current. 2.  Refill today--no recent refills here. 3.  Updated today. Added to biometric form as requested. 4.  Labs reviewed at visit. Follow-up Disposition: 
Return in about 3 months (around 1/29/2019) for Blood Pressure follow-up; 1yr for yearly physical. 
lab results and schedule of future lab studies reviewed with patient 
reviewed diet, exercise and weight control 
reviewed medications and side effects in detail 
radiology results and schedule of future radiology studies reviewed with patient--not interested in scheduling US (ordered with hepatology eval) For additional documentation of information and/or recommendations discussed this visit, please see notes in instructions. Plan and evaluation (above) reviewed with pt at visit Patient voiced understanding of plan and provided with time to ask/review questions. After Visit Summary (AVS) provided to pt after visit with additional instructions as needed/reviewed.

## 2018-10-29 NOTE — PATIENT INSTRUCTIONS
1.  Will fax biometric form once labs result and contact you once faxed, as reviewed. If ESR or C-reactive protein (CRP) elevated, would recommend seeing GI as reviewed for follow-up. 2.  We have both Hep A & B vaccines here if you are interested in receiving. Well Visit, Ages 25 to 48: Care Instructions Your Care Instructions Physical exams can help you stay healthy. Your doctor has checked your overall health and may have suggested ways to take good care of yourself. He or she also may have recommended tests. At home, you can help prevent illness with healthy eating, regular exercise, and other steps. Follow-up care is a key part of your treatment and safety. Be sure to make and go to all appointments, and call your doctor if you are having problems. It's also a good idea to know your test results and keep a list of the medicines you take. How can you care for yourself at home? · Reach and stay at a healthy weight. This will lower your risk for many problems, such as obesity, diabetes, heart disease, and high blood pressure. · Get at least 30 minutes of physical activity on most days of the week. Walking is a good choice. You also may want to do other activities, such as running, swimming, cycling, or playing tennis or team sports. Discuss any changes in your exercise program with your doctor. · Do not smoke or allow others to smoke around you. If you need help quitting, talk to your doctor about stop-smoking programs and medicines. These can increase your chances of quitting for good. · Talk to your doctor about whether you have any risk factors for sexually transmitted infections (STIs). Having one sex partner (who does not have STIs and does not have sex with anyone else) is a good way to avoid these infections. · Use birth control if you do not want to have children at this time. Talk with your doctor about the choices available and what might be best for you. · Protect your skin from too much sun. When you're outdoors from 10 a.m. to 4 p.m., stay in the shade or cover up with clothing and a hat with a wide brim. Wear sunglasses that block UV rays. Even when it's cloudy, put broad-spectrum sunscreen (SPF 30 or higher) on any exposed skin. · See a dentist one or two times a year for checkups and to have your teeth cleaned. · Wear a seat belt in the car. · Drink alcohol in moderation, if at all. That means no more than 2 drinks a day for men and 1 drink a day for women. Follow your doctor's advice about when to have certain tests. These tests can spot problems early. For everyone · Cholesterol. Have the fat (cholesterol) in your blood tested after age 21. Your doctor will tell you how often to have this done based on your age, family history, or other things that can increase your risk for heart disease. · Blood pressure. Have your blood pressure checked during a routine doctor visit. Your doctor will tell you how often to check your blood pressure based on your age, your blood pressure results, and other factors. · Vision. Talk with your doctor about how often to have a glaucoma test. 
· Diabetes. Ask your doctor whether you should have tests for diabetes. · Colon cancer. Have a test for colon cancer at age 48. You may have one of several tests. If you are younger than 48, you may need a test earlier if you have any risk factors. Risk factors include whether you already had a precancerous polyp removed from your colon or whether your parent, brother, sister, or child has had colon cancer. For women · Breast exam and mammogram. Talk to your doctor about when you should have a clinical breast exam and a mammogram. Medical experts differ on whether and how often women under 50 should have these tests. Your doctor can help you decide what is right for you. · Pap test and pelvic exam. Begin Pap tests at age 24.  A Pap test is the best way to find cervical cancer. The test often is part of a pelvic exam. Ask how often to have this test. 
· Tests for sexually transmitted infections (STIs). Ask whether you should have tests for STIs. You may be at risk if you have sex with more than one person, especially if your partners do not wear condoms. For men · Tests for sexually transmitted infections (STIs). Ask whether you should have tests for STIs. You may be at risk if you have sex with more than one person, especially if you do not wear a condom. · Testicular cancer exam. Ask your doctor whether you should check your testicles regularly. · Prostate exam. Talk to your doctor about whether you should have a blood test (called a PSA test) for prostate cancer. Experts differ on whether and when men should have this test. Some experts suggest it if you are older than 39 and are -American or have a father or brother who got prostate cancer when he was younger than 72. When should you call for help? Watch closely for changes in your health, and be sure to contact your doctor if you have any problems or symptoms that concern you. Where can you learn more? Go to http://aissatou-yasir.info/. Enter P072 in the search box to learn more about \"Well Visit, Ages 25 to 48: Care Instructions. \" Current as of: March 29, 2018 Content Version: 11.8 © 3485-1201 [employer]. Care instructions adapted under license by [employer] (which disclaims liability or warranty for this information). If you have questions about a medical condition or this instruction, always ask your healthcare professional. [place] [address] any warranty or liability for your use of this information.

## 2018-10-29 NOTE — PROGRESS NOTES
RM 17 Patient fasting at this time Patient does want flu vaccine. Chief Complaint Patient presents with  Complete Physical  
  request refill on Albuterol inhaler 1. Have you been to the ER, urgent care clinic since your last visit? Hospitalized since your last visit? No 
 
2. Have you seen or consulted any other health care providers outside of the [address] [place] [address] since your last visit? Include any pap smears or colon screening. No 
 
Health Maintenance Due Topic Date Due  
 PAP AKA CERVICAL CYTOLOGY  12/31/1991  Influenza Age 5 to Adult  08/01/2018 PHQ over the last two weeks 10/29/2018 Little interest or pleasure in doing things Not at all Feeling down, depressed, irritable, or hopeless Not at all Total Score PHQ 2 0 Learning Assessment 10/29/2018 PRIMARY LEARNER Patient HIGHEST LEVEL OF EDUCATION - PRIMARY LEARNER  -  
BARRIERS PRIMARY LEARNER NONE  
CO-LEARNER CAREGIVER -  
PRIMARY LANGUAGE ENGLISH  
LEARNER PREFERENCE PRIMARY LISTENING  
ANSWERED BY patient RELATIONSHIP SELF Immunization administered to left deltoid 10/29/2018 by Nayeli Castro LPN with patient's consent. Patient tolerated procedure well. No reactions noted. VIS provided.

## 2018-10-30 ENCOUNTER — TELEPHONE (OUTPATIENT)
Dept: INTERNAL MEDICINE CLINIC | Age: 48
End: 2018-10-30

## 2018-10-30 LAB
ALBUMIN SERPL-MCNC: 4.7 G/DL (ref 3.5–5.5)
ALBUMIN/GLOB SERPL: 2.1 {RATIO} (ref 1.2–2.2)
ALP SERPL-CCNC: 54 IU/L (ref 39–117)
ALT SERPL-CCNC: 37 IU/L (ref 0–32)
AST SERPL-CCNC: 27 IU/L (ref 0–40)
BASOPHILS # BLD AUTO: 0.1 X10E3/UL (ref 0–0.2)
BASOPHILS NFR BLD AUTO: 1 %
BILIRUB SERPL-MCNC: 0.5 MG/DL (ref 0–1.2)
BUN SERPL-MCNC: 15 MG/DL (ref 6–24)
BUN/CREAT SERPL: 20 (ref 9–23)
CALCIUM SERPL-MCNC: 9.1 MG/DL (ref 8.7–10.2)
CHLORIDE SERPL-SCNC: 97 MMOL/L (ref 96–106)
CHOLEST SERPL-MCNC: 261 MG/DL (ref 100–199)
CO2 SERPL-SCNC: 26 MMOL/L (ref 20–29)
CREAT SERPL-MCNC: 0.76 MG/DL (ref 0.57–1)
CRP SERPL-MCNC: 2.3 MG/L (ref 0–4.9)
EOSINOPHIL # BLD AUTO: 0.2 X10E3/UL (ref 0–0.4)
EOSINOPHIL NFR BLD AUTO: 4 %
ERYTHROCYTE [DISTWIDTH] IN BLOOD BY AUTOMATED COUNT: 13 % (ref 12.3–15.4)
ERYTHROCYTE [SEDIMENTATION RATE] IN BLOOD BY WESTERGREN METHOD: 2 MM/HR (ref 0–32)
EST. AVERAGE GLUCOSE BLD GHB EST-MCNC: 105 MG/DL
GLOBULIN SER CALC-MCNC: 2.2 G/DL (ref 1.5–4.5)
GLUCOSE SERPL-MCNC: 110 MG/DL (ref 65–99)
HBA1C MFR BLD: 5.3 % (ref 4.8–5.6)
HCT VFR BLD AUTO: 42.4 % (ref 34–46.6)
HDLC SERPL-MCNC: 66 MG/DL
HGB BLD-MCNC: 14.2 G/DL (ref 11.1–15.9)
IMM GRANULOCYTES # BLD: 0 X10E3/UL (ref 0–0.1)
IMM GRANULOCYTES NFR BLD: 0 %
LDLC SERPL CALC-MCNC: 165 MG/DL (ref 0–99)
LYMPHOCYTES # BLD AUTO: 1.8 X10E3/UL (ref 0.7–3.1)
LYMPHOCYTES NFR BLD AUTO: 28 %
MCH RBC QN AUTO: 33.5 PG (ref 26.6–33)
MCHC RBC AUTO-ENTMCNC: 33.5 G/DL (ref 31.5–35.7)
MCV RBC AUTO: 100 FL (ref 79–97)
MONOCYTES # BLD AUTO: 0.7 X10E3/UL (ref 0.1–0.9)
MONOCYTES NFR BLD AUTO: 10 %
NEUTROPHILS # BLD AUTO: 3.7 X10E3/UL (ref 1.4–7)
NEUTROPHILS NFR BLD AUTO: 57 %
PLATELET # BLD AUTO: 208 X10E3/UL (ref 150–379)
POTASSIUM SERPL-SCNC: 3.8 MMOL/L (ref 3.5–5.2)
PROT SERPL-MCNC: 6.9 G/DL (ref 6–8.5)
RBC # BLD AUTO: 4.24 X10E6/UL (ref 3.77–5.28)
SODIUM SERPL-SCNC: 137 MMOL/L (ref 134–144)
TRIGL SERPL-MCNC: 148 MG/DL (ref 0–149)
VLDLC SERPL CALC-MCNC: 30 MG/DL (ref 5–40)
WBC # BLD AUTO: 6.5 X10E3/UL (ref 3.4–10.8)

## 2018-10-30 NOTE — TELEPHONE ENCOUNTER
Spoke with pt, after verifying pt name and . Let  Pt know I had faxed her biometric form to [facility] -  # [phone] and that I would be mailing her original form along with her latest labs and lab letter. Went over labs and recommendations. Pt stated that she had stopped taking her atorvastatin for 5-6 months now. Pt would like an rx for atorvastatin  sent into her pharmacy  .

## 2018-10-30 NOTE — TELEPHONE ENCOUNTER
Dr. Jeanmarie Young had already sent in refill on 8-17-18 to [facility] on [facility] for #90 with 3 refills. Receipt electronically verified by pharmacy. Please confirm pharmacy already has refill and if pt can pick-up or needs new script sent.

## 2018-10-30 NOTE — TELEPHONE ENCOUNTER
Please fax biometric form today or tomorrow. Seen yesterday and they need by 10/31. Once faxed, please let pt know faxed and review labs/letter with her. She just was thinking about switching PCP to me. She has seen Dr. Marci Montalvo regularly and Ms. Ebony Brandt in past also. Her LDL was elevated. She can work on her diet, or increase atorvastatin from 10mg nightly to 20mg nightly. If she increases her atorvastatin, would repeat liver enzymes on meds (still still drinks alcohol regulalry) in 2-4 weeks, and repeat fasting labs in 3mo. No future appointments. Please review she should take a B12 or folate supplement as a multivitamin and can check levels with next labs due to abnormal MCV on her blood counts.

## 2018-10-31 NOTE — TELEPHONE ENCOUNTER
Spoke with pt , after verifying pt  . Let pt know she had refills on her atorvastatin that Dr. Suri Eid had prescribed at her pharmacy. Pt verbalized understanding.

## 2019-01-14 ENCOUNTER — OFFICE VISIT (OUTPATIENT)
Dept: INTERNAL MEDICINE CLINIC | Age: 49
End: 2019-01-14

## 2019-01-14 VITALS
RESPIRATION RATE: 16 BRPM | TEMPERATURE: 97.5 F | SYSTOLIC BLOOD PRESSURE: 138 MMHG | BODY MASS INDEX: 24.25 KG/M2 | OXYGEN SATURATION: 98 % | WEIGHT: 160 LBS | HEART RATE: 79 BPM | DIASTOLIC BLOOD PRESSURE: 90 MMHG | HEIGHT: 68 IN

## 2019-01-14 DIAGNOSIS — R10.813 RIGHT LOWER QUADRANT ABDOMINAL TENDERNESS WITHOUT REBOUND TENDERNESS: Primary | ICD-10-CM

## 2019-01-14 DIAGNOSIS — K50.90 CROHN'S DISEASE WITHOUT COMPLICATION, UNSPECIFIED GASTROINTESTINAL TRACT LOCATION (HCC): ICD-10-CM

## 2019-01-14 DIAGNOSIS — R30.9 PAINFUL URINATION: ICD-10-CM

## 2019-01-14 DIAGNOSIS — R19.7 WATERY DIARRHEA: ICD-10-CM

## 2019-01-14 LAB
BILIRUB UR QL STRIP: NEGATIVE
GLUCOSE UR-MCNC: NEGATIVE MG/DL
KETONES P FAST UR STRIP-MCNC: NEGATIVE MG/DL
PH UR STRIP: 5.5 [PH] (ref 4.6–8)
PROT UR QL STRIP: NEGATIVE
SP GR UR STRIP: 1 (ref 1–1.03)
UA UROBILINOGEN AMB POC: NORMAL (ref 0.2–1)
URINALYSIS CLARITY POC: CLEAR
URINALYSIS COLOR POC: YELLOW
URINE BLOOD POC: NORMAL
URINE LEUKOCYTES POC: NEGATIVE
URINE NITRITES POC: NEGATIVE

## 2019-01-14 NOTE — PATIENT INSTRUCTIONS
Appointment at 12:45. Arrive at least 20 mintues early to update paperwork. Gianna Carrillo. Dr. Lo Molina. [facility], [address]. Phone: [phone]. Fax  [phone]

## 2019-01-14 NOTE — PROGRESS NOTES
RM 2 
 
Chief Complaint Patient presents with  Abdominal Pain  
  right side pain x 4 days. Painful with touch 1. Have you been to the ER, urgent care clinic since your last visit? Hospitalized since your last visit? No 
 
2. Have you seen or consulted any other health care providers outside of the [address] [place] [address] since your last visit? Include any pap smears or colon screening. No 
 
Health Maintenance Due Topic Date Due  
 PAP AKA CERVICAL CYTOLOGY  12/31/1991 Pt last pap 1 yr ago

## 2019-01-14 NOTE — PROGRESS NOTES
WALKER Chapman is a 50 y.o. female, she presents today for: 
 
 RLQ pain. 50year old with possible Crohn's disease. States that she has been given multiple opinions. Last evaluation with Dr. Isaac Muniz around 2014. Has not been on medications for this. In last 6 months has had a narrowing of stool caliber. Then on Friday 1.11, started with nausea, lump in throat and more frequent stool ing (often has diarrhea several times a week and this seemed somewhat similar just more). No blood in stool. Then on saturday, Sunday, today with persistent RLQ tendenress (noted when cat walked on her, when boyfriend hugged her, or when she herself presses deeply). Some ongoing soft small caliber stools . No fever, no vomitting, anorexia but no nausea. No new rash, no new oral ulcers, no joint pains. Friday started feeling nausea, lump in throat, stomach upset. Mild diarrhea, but not out of ordinary. Now with persistent dull pain in right lower quadrant. Notes a tenderness when cat stepped on her stomach, and when boyfriend hugged her tight. Ongoing feeling of bloat. Has had small caliber stool in small volume today, Feeling of urinary frequency but with little production Manages Crohn's with diet. PMH/PSH: reviewed and updated Sochx:  reports that she quit smoking about 7 years ago. She smoked 0.25 packs per day. she has never used smokeless tobacco. She reports that she drinks about 9.0 oz of alcohol per week. She reports that she does not use drugs. Famhx: reviewed and updated All: No Known Allergies Med:  
Current Outpatient Medications Medication Sig  
 albuterol (PROVENTIL HFA, VENTOLIN HFA, PROAIR HFA) 90 mcg/actuation inhaler Take 2 Puffs by inhalation every four (4) hours as needed for Wheezing.  traZODone (DESYREL) 50 mg tablet take 1 tablet by mouth NIGHTLY  metoprolol succinate (TOPROL-XL) 100 mg tablet take 1 tablet by mouth once daily  valACYclovir (VALTREX) 500 mg tablet Take 1 Tab by mouth daily. Indications: genital herpes simplex (Patient taking differently: Take 250 mg by mouth daily.)  montelukast (SINGULAIR) 10 mg tablet Take 1 Tab by mouth daily.  atorvastatin (LIPITOR) 10 mg tablet Take 1 Tab by mouth daily.  losartan (COZAAR) 25 mg tablet Take 1 Tab by mouth daily.  pantoprazole (PROTONIX) 40 mg tablet Take 1 Tab by mouth daily.  coenzyme q10 (CO Q-10) 10 mg cap Take  by mouth.  folic acid 276 mcg tablet Take 1 Tab by mouth daily.  levonorgestrel (MIRENA) 20 mcg/24 hr (5 years) IUD 1 Each by IntraUTERine route once.  Lactobacillus acidophilus (ACIDOPHILUS) cap Take 2 Caps by mouth two (2) times a day.  sertraline (ZOLOFT) 50 mg tablet take 1 tablet by mouth once daily  thiamine (B-1) 100 mg tablet Take 1 Tab by mouth daily. No current facility-administered medications for this visit. Review of Systems Constitutional: Negative for chills, fever and malaise/fatigue. Respiratory: Negative for shortness of breath. Cardiovascular: Negative for chest pain. Gastrointestinal: Negative for heartburn. Genitourinary: Positive for frequency. Negative for dysuria and hematuria. Neurological: Negative for dizziness. PE: 
Blood pressure 138/90, pulse 79, temperature 97.5 °F (36.4 °C), temperature source Oral, resp. rate 16, height 5' 8\" (1.727 m), weight 160 lb (72.6 kg), SpO2 98 %. Body mass index is 24.33 kg/m². Physical Exam  
Constitutional: She is oriented to person, place, and time. She appears well-developed and well-nourished. No distress. HENT:  
Head: Normocephalic. Mouth/Throat: Oropharynx is clear and moist.  
Eyes: Conjunctivae and EOM are normal.  
Neck: Neck supple. Cardiovascular: Normal rate, regular rhythm and normal heart sounds. Exam reveals no friction rub. No murmur heard.  
Pulmonary/Chest: Effort normal and breath sounds normal.  
 Abdominal: Soft. Bowel sounds are normal. She exhibits no mass. There is tenderness. There is no rebound and no guarding. Mild fullness, no distension, no fluid wave. Tender only in RLQ. No rebound no mass felt . Neurological: She is alert and oriented to person, place, and time. Skin: Skin is warm and dry. Nursing note and vitals reviewed. Labs:  
Results for orders placed or performed in visit on 01/14/19 AMB POC URINALYSIS DIP STICK MANUAL W/ MICRO Result Value Ref Range Color (UA POC) Yellow Clarity (UA POC) Clear Glucose (UA POC) Negative Negative Bilirubin (UA POC) Negative Negative Ketones (UA POC) Negative Negative Specific gravity (UA POC) 1.005 1.001 - 1.035 Blood (UA POC) Trace Negative pH (UA POC) 5.5 4.6 - 8.0 Protein (UA POC) Negative Negative Urobilinogen (UA POC) 0.2 mg/dL 0.2 - 1 Nitrites (UA POC) Negative Negative Leukocyte esterase (UA POC) Negative Negative A/P: 
50 y.o. female ICD-10-CM ICD-9-CM 1. Painful urination R30.9 788.1 AMB POC URINALYSIS DIP STICK MANUAL W/ MICRO 2. Watery diarrhea R19.7 787.91 C DIFFICILE TOXIN A & B BY EIA  
   REFERRAL TO GASTROENTEROLOGY 3. Right lower quadrant abdominal tenderness without rebound tenderness R10.813 789.63 REFERRAL TO GASTROENTEROLOGY 4. Crohn's disease without complication, unspecified gastrointestinal tract location ([facility].) K50.90 555.9 Possible Crohn's disease, new RLQ pain, new change in stool caliber. - exam with moderate tenderness only in RLQ, but no rebound nor peritoneal signs, non-toxic, well appearing.  
 - pain, bloating, and chronic stool caliber change most concerning to me for possible crohn's disease flare.  
 - unable to get labs processed today given late hour.  
 - called and requested follow-up tomorrow with Dr. Juan Macario or team member. Scheduled at 12:45 and information provided. Patient was evaluated by Tyler Ray in 10/2017 and 11/2017 for mild LFT increase. Resolved after stopping alcohol. No new findings on labs for viral/autoimmune hepatitis. - She was given AVS and expressed understanding with the diagnosis and plan as discussed. Follow-up Disposition: 
Return if symptoms worsen or fail to improve. No future appointments.

## 2019-02-12 DIAGNOSIS — I10 ESSENTIAL HYPERTENSION, BENIGN: ICD-10-CM

## 2019-02-12 RX ORDER — LOSARTAN POTASSIUM 25 MG/1
TABLET ORAL
Qty: 90 TAB | Refills: 1 | Status: SHIPPED | OUTPATIENT
Start: 2019-02-12 | End: 2019-04-23

## 2019-03-30 DIAGNOSIS — K21.9 GASTROESOPHAGEAL REFLUX DISEASE WITHOUT ESOPHAGITIS: ICD-10-CM

## 2019-04-01 RX ORDER — PANTOPRAZOLE SODIUM 40 MG/1
TABLET, DELAYED RELEASE ORAL
Qty: 90 TAB | Refills: 3 | Status: SHIPPED | OUTPATIENT
Start: 2019-04-01 | End: 2020-03-14

## 2019-04-18 ENCOUNTER — OFFICE VISIT (OUTPATIENT)
Dept: INTERNAL MEDICINE CLINIC | Age: 49
End: 2019-04-18

## 2019-04-18 VITALS
HEART RATE: 75 BPM | HEIGHT: 68 IN | BODY MASS INDEX: 24.92 KG/M2 | TEMPERATURE: 97.9 F | SYSTOLIC BLOOD PRESSURE: 129 MMHG | WEIGHT: 164.4 LBS | OXYGEN SATURATION: 99 % | DIASTOLIC BLOOD PRESSURE: 88 MMHG

## 2019-04-18 DIAGNOSIS — R94.31 ANTERIOR T WAVE INVERSION: ICD-10-CM

## 2019-04-18 DIAGNOSIS — R06.09 EXERTIONAL DYSPNEA: Primary | ICD-10-CM

## 2019-04-18 DIAGNOSIS — R79.89 ELEVATED LFTS: ICD-10-CM

## 2019-04-18 DIAGNOSIS — Z82.49 FAMILY HISTORY OF CORONARY ARTERIOSCLEROSIS: ICD-10-CM

## 2019-04-18 DIAGNOSIS — F10.20 UNCOMPLICATED ALCOHOL DEPENDENCE (HCC): ICD-10-CM

## 2019-04-18 DIAGNOSIS — F41.1 GAD (GENERALIZED ANXIETY DISORDER): ICD-10-CM

## 2019-04-18 RX ORDER — ASPIRIN 325 MG
325 TABLET ORAL DAILY
Qty: 30 TAB | Refills: 0
Start: 2019-04-18 | End: 2021-04-09 | Stop reason: ALTCHOICE

## 2019-04-18 RX ORDER — ASPIRIN 325 MG
325 TABLET ORAL
Qty: 1 TAB | Refills: 0 | Status: SHIPPED | COMMUNITY
Start: 2019-04-18 | End: 2019-04-18 | Stop reason: ALTCHOICE

## 2019-04-18 RX ORDER — NITROGLYCERIN 0.3 MG/1
0.3 TABLET SUBLINGUAL
Qty: 1 BOTTLE | Refills: 0 | Status: SHIPPED | OUTPATIENT
Start: 2019-04-18 | End: 2021-04-09 | Stop reason: ALTCHOICE

## 2019-04-18 RX ORDER — SERTRALINE HYDROCHLORIDE 50 MG/1
TABLET, FILM COATED ORAL
Qty: 90 TAB | Refills: 3 | Status: SHIPPED | OUTPATIENT
Start: 2019-04-18 | End: 2020-05-13

## 2019-04-18 NOTE — PROGRESS NOTES
RM 2 
 
Per pt , when bending over she gets short of breath x 2 weeks, and on exertions Pt also states she has been getting more anxious within the last 2 months . Chief Complaint Patient presents with  Shortness of Breath 1. Have you been to the ER, urgent care clinic since your last visit? Hospitalized since your last visit? No 
 
2. Have you seen or consulted any other health care providers outside of the [address] [place] [address] since your last visit? Include any pap smears or colon screening. No 
 
Health Maintenance Due Topic Date Due  
 PAP AKA CERVICAL CYTOLOGY  12/31/1991

## 2019-04-18 NOTE — PATIENT INSTRUCTIONS
Angina: Care Instructions Your Care Instructions You have a problem called angina. Angina happens when there is not enough blood flow to your heart muscle. Angina is a sign of coronary artery disease (CAD). CAD occurs when blood vessels that supply the heart become narrowed. Having CAD increases your risk of a heart attack. Chest pain or pressure is the most common symptom of angina. But some people have other symptoms, like: 
· Pain, pressure, or a strange feeling in the back, neck, jaw, or upper belly, or in one or both shoulders or arms. · Shortness of breath. · Nausea or vomiting. · Lightheadedness or sudden weakness. · Fast or irregular heartbeat. Women are somewhat more likely than men to have angina symptoms like shortness of breath, nausea, and back or jaw pain. Angina can be dangerous. That's why it is important to pay attention to your symptoms. Know what is typical for you, learn how to control your symptoms, and understand when you need to get treatment. A change in your usual pattern of symptoms is an emergency. It may mean that you are having a heart attack. The doctor has checked you carefully, but problems can develop later. If you notice any problems or new symptoms, get medical treatment right away. Follow-up care is a key part of your treatment and safety. Be sure to make and go to all appointments, and call your doctor if you are having problems. It's also a good idea to know your test results and keep a list of the medicines you take. How can you care for yourself at home? Medicines 
  · If your doctor has given you nitroglycerin for angina symptoms, keep it with you at all times. If you have symptoms, sit down and rest, and take the first dose of nitroglycerin as directed. If your symptoms get worse or are not getting better within 5 minutes, call 911 right away. Stay on the phone. The emergency  will give you further instructions.   · If your doctor advises it, take 1 low-dose aspirin a day to prevent heart attack.  
  · Be safe with medicines. Take your medicines exactly as prescribed. Call your doctor if you think you are having a problem with your medicine. You will get more details on the specific medicines your doctor prescribes.  
 Lifestyle changes 
  · Do not smoke. If you need help quitting, talk to your doctor about stop-smoking programs and medicines. These can increase your chances of quitting for good.  
  · Eat a heart-healthy diet that is low in saturated fat and salt, and is high in fiber. Talk to your doctor or a dietitian about healthy eating.  
  · Stay at a healthy weight. Or lose weight if you need to. Activity 
  · Talk to your doctor about a level of activity that is safe for you.  
  · If an activity causes angina symptoms, stop and rest.  
When should you call for help? Call 911 anytime you think you may need emergency care. For example, call if: 
  · You passed out (lost consciousness).  
  · You have symptoms of a heart attack. These may include: 
? Chest pain or pressure, or a strange feeling in the chest. 
? Sweating. ? Shortness of breath. ? Nausea or vomiting. ? Pain, pressure, or a strange feeling in the back, neck, jaw, or upper belly or in one or both shoulders or arms. ? Lightheadedness or sudden weakness. ? A fast or irregular heartbeat. After you call 911, the  may tell you to chew 1 adult-strength or 2 to 4 low-dose aspirin. Wait for an ambulance. Do not try to drive yourself.  
  · You have angina symptoms that do not go away with rest or are not getting better within 5 minutes after you take a dose of nitroglycerin.  
 Call your doctor now or seek immediate medical care if: 
  · You are having angina symptoms more often than usual, or they are different or worse than usual.  
  · You feel dizzy or lightheaded, or you feel like you may faint.  Watch closely for changes in your health, and be sure to contact your doctor if you have any problems. Where can you learn more? Go to http://aissatou-yasir.info/. Enter H129 in the search box to learn more about \"Angina: Care Instructions. \" Current as of: July 22, 2018 Content Version: 11.9 © 1836-7273 [employer]. Care instructions adapted under license by [employer] (which disclaims liability or warranty for this information). If you have questions about a medical condition or this instruction, always ask your healthcare professional. [address] any warranty or liability for your use of this information.

## 2019-04-18 NOTE — PROGRESS NOTES
ACUTE VISIT  
 
HPI:  
Marty Roque is a 50 y.o. female, she presents today for: \"Feeling out of breath with simple things. \" Ongoing for 2 weeks. Today every time she moved from bending over to sitting up has been feeling out of breath. Heart palpitations and flutters. No chest pain, but feels a little pressure on her back. No diaphoresis. + nausea. Also a lot of insomnia and feeling extra sweating at night. Heart disease runs in the family. Has been back on lipitor for last few months. Does not take aspirin. Needing to take deeper breaths, and feeling heart race more than normal.  
 
+ family history of early cardiac disease in paternal family. (all uncles  before age 48 or 54 of cardiac or aneurysmal disease). + history of smoking ~ 20 pack years, qut 8 years ago. ROS: no cough, no wheezing, no sneezing or other respiratory symptoms. Medications used for acute illness: none Current Outpatient Medications on File Prior to Visit Medication Sig  pantoprazole (PROTONIX) 40 mg tablet take 1 tablet by mouth once daily  losartan (COZAAR) 25 mg tablet take 1 tablet by mouth once daily  albuterol (PROVENTIL HFA, VENTOLIN HFA, PROAIR HFA) 90 mcg/actuation inhaler Take 2 Puffs by inhalation every four (4) hours as needed for Wheezing.  traZODone (DESYREL) 50 mg tablet take 1 tablet by mouth NIGHTLY  metoprolol succinate (TOPROL-XL) 100 mg tablet take 1 tablet by mouth once daily  valACYclovir (VALTREX) 500 mg tablet Take 1 Tab by mouth daily. Indications: genital herpes simplex (Patient taking differently: Take 250 mg by mouth daily.)  montelukast (SINGULAIR) 10 mg tablet Take 1 Tab by mouth daily.  atorvastatin (LIPITOR) 10 mg tablet Take 1 Tab by mouth daily.  levonorgestrel (MIRENA) 20 mcg/24 hr (5 years) IUD 1 Each by IntraUTERine route once.  sertraline (ZOLOFT) 50 mg tablet take 1 tablet by mouth once daily  coenzyme q10 (CO Q-10) 10 mg cap Take  by mouth.  thiamine (B-1) 100 mg tablet Take 1 Tab by mouth daily.  folic acid 882 mcg tablet Take 1 Tab by mouth daily.  Lactobacillus acidophilus (ACIDOPHILUS) cap Take 2 Caps by mouth two (2) times a day. No current facility-administered medications on file prior to visit. No Known Allergies PMH/PSH/FH: reviewed and updated Sochx: 
 reports that she quit smoking about 7 years ago. She smoked 0.25 packs per day. She has never used smokeless tobacco. She reports that she drinks about 9.0 oz of alcohol per week. She reports that she does not use drugs. PE: 
Blood pressure 129/88, pulse 75, temperature 97.9 °F (36.6 °C), temperature source Oral, height 5' 8\" (1.727 m), weight 164 lb 6.4 oz (74.6 kg), SpO2 99 %. Body mass index is 25 kg/m². Physical Exam  
Constitutional: She is oriented to person, place, and time. She appears well-developed and well-nourished. No distress. HENT:  
Head: Normocephalic. Mouth/Throat: Oropharynx is clear and moist.  
Eyes: Conjunctivae and EOM are normal.  
Neck: Neck supple. Cardiovascular: Normal rate, regular rhythm and normal heart sounds. Pulmonary/Chest: Effort normal and breath sounds normal. No stridor. No respiratory distress. She has no wheezes. Abdominal: Soft. She exhibits no distension. Neurological: She is alert and oriented to person, place, and time. Skin: Skin is warm and dry. Nursing note and vitals reviewed. Labs: 
No results found for any visits on 04/18/19. EKG shows: inverted T waves in V1-V4 not seen on prior EKG. A/P Amanda A. Lilo Given was seen today for had concerns including Shortness of Breath (x 2 weeks , ) and Agitation (anxiety x 2 months increased). .  The diagnosis and plan was discussed including: ICD-10-CM ICD-9-CM 1. Exertional dyspnea R06.09 786.09 AMB POC EKG ROUTINE W/ 12 LEADS, INTER & REP  
   CBC W/O DIFF  
   aspirin (ASPIRIN) 325 mg tablet nitroglycerin (NITROSTAT) 0.3 mg SL tablet REFERRAL TO CARDIOLOGY METABOLIC PANEL, COMPREHENSIVE 2. Anterior T wave inversion R94.31 794.31 aspirin (ASPIRIN) 325 mg tablet  
   nitroglycerin (NITROSTAT) 0.3 mg SL tablet REFERRAL TO CARDIOLOGY METABOLIC PANEL, COMPREHENSIVE 3. Family history of coronary arteriosclerosis Z82.49 V17.3 aspirin (ASPIRIN) 325 mg tablet  
   nitroglycerin (NITROSTAT) 0.3 mg SL tablet REFERRAL TO CARDIOLOGY METABOLIC PANEL, COMPREHENSIVE 4. DANNY (generalized anxiety disorder) F41.1 300.02 sertraline (ZOLOFT) 50 mg tablet TSH 3RD GENERATION  
   T4, FREE  
   METABOLIC PANEL, COMPREHENSIVE 5. Alcohol dependence ([facility] [address] I93.51 410.16 METABOLIC PANEL, COMPREHENSIVE 6. Elevated LFTs R81.7 735.7 METABOLIC PANEL, COMPREHENSIVE Exertional dyspnea possible anginal equivalent Patient with high risk profile for CAD, new symptoms consistent with cardiac ischemia, and new changes on EKG. Highly concerning for unstable angina. BP borderline controlled. Called and discussed case with Dr. Rocío Mao who reviewed EKG noting only minimal changes. Discussed option of ER referral vs urgent follow-up in cardiology clinic. Discussed with patient options and she much prefers to wait and be seen in clinic. She is reliable. - add ASA. Add \"pill in pocket\" and advised to call 911 if symptoms not resolved within 10 minutes. - continue BP and statin medication.  
 - labs requested to screen for non cardiac cause of dyspnea. Given time of day unable to draw today, patient will get done in AM at [facility] location. Anxiety: patient agrees overall increased over last 2 months. Will restart sertraline. She previously self-discontinued because of wanting to take fewer medicines, not because she was having side effect or other trouble Alcohol use: patient is very arnol about use.  Is aware that this impacts her health and would be better to stop. Today reports: Not drinking every day, drinks 4 days a week, 3 or 10 drinks depending on the days.  
 
- I advised her to call back or return to office if symptoms worsen/change/persist. 
- She was given AVS and expressed understanding with the diagnosis and plan as discussed. Follow-up and Dispositions · Return in about 6 weeks (around 5/30/2019) for follow-up mood.

## 2019-04-18 NOTE — PROGRESS NOTES
RM 
 
No chief complaint on file. 1. Have you been to the ER, urgent care clinic since your last visit? Hospitalized since your last visit? 2. Have you seen or consulted any other health care providers outside of the [address] [place] [address] since your last visit? Include any pap smears or colon screening. Health Maintenance Due Topic Date Due  
 PAP AKA CERVICAL CYTOLOGY  12/31/1991 No flowsheet data found. Learning Assessment 10/29/2018 PRIMARY LEARNER Patient HIGHEST LEVEL OF EDUCATION - PRIMARY LEARNER  -  
BARRIERS PRIMARY LEARNER NONE  
CO-LEARNER CAREGIVER -  
PRIMARY LANGUAGE ENGLISH  
LEARNER PREFERENCE PRIMARY LISTENING  
ANSWERED BY patient RELATIONSHIP SELF  
 
3 most recent PHQ Screens 1/14/2019 Little interest or pleasure in doing things Not at all Feeling down, depressed, irritable, or hopeless Not at all Total Score PHQ 2 0

## 2019-04-19 ENCOUNTER — TELEPHONE (OUTPATIENT)
Dept: INTERNAL MEDICINE CLINIC | Age: 49
End: 2019-04-19

## 2019-04-19 NOTE — TELEPHONE ENCOUNTER
Please call and confirm referral to cardiologist (Dr. Amelia Finney team) today or Monday-Tuesday next week.      Thanks  Rishi Alonso MD

## 2019-04-19 NOTE — TELEPHONE ENCOUNTER
Called Dr. Jossie Cota  office , spoke with [address] [place] [address] after verifying pt name and . . Scheduled pt to be seen 19 at 3:40 pm. Advised Crystal I will be faxing over pt office visit note from yesterday along with her ekg. Spoke with pt , after verifying pt name and . Informed pt that I have gotten her scheduled with Dr. Amara Grant on 19 at 3:40 pm with a check in time of 3:10 to complete paperwork. . Provided affress and phone # of Dr. Stanford Viera office. Pt verbalized understanding.     Copy of office visit notes 19 and ekg faxed to Dr. Amara Grant- F # [phone]

## 2019-04-20 LAB
ALBUMIN SERPL-MCNC: 4.6 G/DL (ref 3.5–5.5)
ALBUMIN/GLOB SERPL: 1.7 {RATIO} (ref 1.2–2.2)
ALP SERPL-CCNC: 63 IU/L (ref 39–117)
ALT SERPL-CCNC: 65 IU/L (ref 0–32)
AST SERPL-CCNC: 60 IU/L (ref 0–40)
BILIRUB SERPL-MCNC: 0.6 MG/DL (ref 0–1.2)
BUN SERPL-MCNC: 10 MG/DL (ref 6–24)
BUN/CREAT SERPL: 14 (ref 9–23)
CALCIUM SERPL-MCNC: 9.5 MG/DL (ref 8.7–10.2)
CHLORIDE SERPL-SCNC: 100 MMOL/L (ref 96–106)
CO2 SERPL-SCNC: 24 MMOL/L (ref 20–29)
CREAT SERPL-MCNC: 0.7 MG/DL (ref 0.57–1)
ERYTHROCYTE [DISTWIDTH] IN BLOOD BY AUTOMATED COUNT: 13.8 % (ref 12.3–15.4)
GLOBULIN SER CALC-MCNC: 2.7 G/DL (ref 1.5–4.5)
GLUCOSE SERPL-MCNC: 105 MG/DL (ref 65–99)
HCT VFR BLD AUTO: 41.5 % (ref 34–46.6)
HGB BLD-MCNC: 14.3 G/DL (ref 11.1–15.9)
MCH RBC QN AUTO: 35.1 PG (ref 26.6–33)
MCHC RBC AUTO-ENTMCNC: 34.5 G/DL (ref 31.5–35.7)
MCV RBC AUTO: 102 FL (ref 79–97)
PLATELET # BLD AUTO: 184 X10E3/UL (ref 150–379)
POTASSIUM SERPL-SCNC: 3.8 MMOL/L (ref 3.5–5.2)
PROT SERPL-MCNC: 7.3 G/DL (ref 6–8.5)
RBC # BLD AUTO: 4.07 X10E6/UL (ref 3.77–5.28)
SODIUM SERPL-SCNC: 141 MMOL/L (ref 134–144)
T4 FREE SERPL-MCNC: 1.26 NG/DL (ref 0.82–1.77)
TSH SERPL DL<=0.005 MIU/L-ACNC: 1.53 UIU/ML (ref 0.45–4.5)
WBC # BLD AUTO: 6.9 X10E3/UL (ref 3.4–10.8)

## 2019-04-22 ENCOUNTER — TELEPHONE (OUTPATIENT)
Dept: INTERNAL MEDICINE CLINIC | Age: 49
End: 2019-04-22

## 2019-04-22 NOTE — TELEPHONE ENCOUNTER
Verified name/. Notified pt of providers message noted below. She verified understanding. She also states she isnt taking her folate and vit b. She states she will start again. Reminded pt of f/u needed next month. She states she has a cardio appt. Tomorrow. Labs overall do not reveal a cause for shortness of breath. However do have changes consistent with heavy alcohol use including signs of liver inflammation and large red blood cells.      Please call and advise patient that her she has alcohol effects on liver and bone marrow. Stopping drinking would be the best thing.  In addition to this, please confirm she is taking her daily folate and B vitamins.      Keesha Rendon MD

## 2019-04-22 NOTE — TELEPHONE ENCOUNTER
Labs overall do not reveal a cause for shortness of breath. However do have changes consistent with heavy alcohol use including signs of liver inflammation and large red blood cells. Please call and advise patient that her she has alcohol effects on liver and bone marrow. Stopping drinking would be the best thing. In addition to this, please confirm she is taking her daily folate and B vitamins.      Yael Laws MD

## 2019-04-22 NOTE — PROGRESS NOTES
Labs overall do not reveal a cause for shortness of breath. However do have changes consistent with heavy alcohol use including signs of liver inflammation and large red blood cells. Please call and advise patient that her she has alcohol effects on liver and bone marrow. Stopping drinking would be the best thing. In addition to this, please confirm she is taking her daily folate and B vitamins. (See phone note) Thanks Alyse Neves MD

## 2019-04-23 ENCOUNTER — OFFICE VISIT (OUTPATIENT)
Dept: CARDIOLOGY CLINIC | Age: 49
End: 2019-04-23

## 2019-04-23 VITALS
BODY MASS INDEX: 24.89 KG/M2 | SYSTOLIC BLOOD PRESSURE: 130 MMHG | WEIGHT: 164.2 LBS | DIASTOLIC BLOOD PRESSURE: 100 MMHG | OXYGEN SATURATION: 98 % | HEART RATE: 90 BPM | RESPIRATION RATE: 16 BRPM | HEIGHT: 68 IN

## 2019-04-23 DIAGNOSIS — I10 ESSENTIAL HYPERTENSION, BENIGN: ICD-10-CM

## 2019-04-23 DIAGNOSIS — R00.2 PALPITATIONS: ICD-10-CM

## 2019-04-23 DIAGNOSIS — R94.31 ABNORMAL EKG: Primary | ICD-10-CM

## 2019-04-23 DIAGNOSIS — R06.02 SOB (SHORTNESS OF BREATH): ICD-10-CM

## 2019-04-23 DIAGNOSIS — R07.89 OTHER CHEST PAIN: ICD-10-CM

## 2019-04-23 DIAGNOSIS — E78.2 MIXED HYPERLIPIDEMIA: ICD-10-CM

## 2019-04-23 RX ORDER — LOSARTAN POTASSIUM 50 MG/1
TABLET ORAL
Qty: 90 TAB | Refills: 1 | Status: SHIPPED | OUTPATIENT
Start: 2019-04-23 | End: 2020-03-16 | Stop reason: SDUPTHER

## 2019-04-23 NOTE — PROGRESS NOTES
HISTORY OF PRESENT ILLNESS  Amanda Zhang is a 50 y.o. female     SUMMARY:   Problem List  Date Reviewed: 4/23/2019          Codes Class Noted    Hyperlipidemia ICD-10-CM: E78.5  ICD-9-CM: 272.4  3/28/2018        Elevated LFTs ICD-10-CM: R94.5  ICD-9-CM: 790.6  11/6/2017        Herpes simplex vulvovaginitis ICD-10-CM: A60.04  ICD-9-CM: 054.11  8/23/2017        Alcohol dependence ([facility] [address] ICD-10-CM: F10.20  ICD-9-CM: 303.90  5/25/2016        Hypomagnesemia ICD-10-CM: E83.42  ICD-9-CM: 275.2  Unknown        Vitamin D deficiency ICD-10-CM: E55.9  ICD-9-CM: 268.9  3/18/2015        Crohn's disease - possible ICD-10-CM: K50.90  ICD-9-CM: 555.9  3/10/2015    Overview Signed 5/25/2016 11:16 AM by Ashok Duron MD     Patient reports GI did biopsy for ulcers in colon that was negative. Since changing diet in 2015 has not had recurrence of diarrhea nor abd pain. Allergic asthma ICD-10-CM: J45.909  ICD-9-CM: 493.00  9/28/2009        Essential hypertension, benign ICD-10-CM: I10  ICD-9-CM: 401.1  6/9/2009        Hemorrhoids ICD-10-CM: K64.9  ICD-9-CM: 455.6  6/9/2009              Current Outpatient Medications on File Prior to Visit   Medication Sig    aspirin (ASPIRIN) 325 mg tablet Take 1 Tab by mouth daily.  nitroglycerin (NITROSTAT) 0.3 mg SL tablet 1 Tab by SubLINGual route every five (5) minutes as needed for Chest Pain. Call 911 if pain not resolved in 10 mintues    sertraline (ZOLOFT) 50 mg tablet take 1 tablet by mouth once daily    pantoprazole (PROTONIX) 40 mg tablet take 1 tablet by mouth once daily    albuterol (PROVENTIL HFA, VENTOLIN HFA, PROAIR HFA) 90 mcg/actuation inhaler Take 2 Puffs by inhalation every four (4) hours as needed for Wheezing.  traZODone (DESYREL) 50 mg tablet take 1 tablet by mouth NIGHTLY    metoprolol succinate (TOPROL-XL) 100 mg tablet take 1 tablet by mouth once daily    valACYclovir (VALTREX) 500 mg tablet Take 1 Tab by mouth daily.  Indications: genital herpes simplex (Patient taking differently: Take 250 mg by mouth daily.)    montelukast (SINGULAIR) 10 mg tablet Take 1 Tab by mouth daily.  atorvastatin (LIPITOR) 10 mg tablet Take 1 Tab by mouth daily.  coenzyme q10 (CO Q-10) 10 mg cap Take  by mouth.  thiamine (B-1) 100 mg tablet Take 1 Tab by mouth daily.  folic acid 634 mcg tablet Take 1 Tab by mouth daily.  levonorgestrel (MIRENA) 20 mcg/24 hr (5 years) IUD 1 Each by IntraUTERine route once.  Lactobacillus acidophilus (ACIDOPHILUS) cap Take 2 Caps by mouth two (2) times a day. No current facility-administered medications on file prior to visit. CARDIOLOGY STUDIES TO DATE:  None      Chief Complaint   Patient presents with    Abnormal EKG     HPI :  Ms. Shea is a 50year-old referred by Dr. Clay Waller for cardiac evaluation. For the last couple of months, she has had intermittent palpitations several times per week lasting a few minutes at a time without associated symptoms. She has also noticed some dyspnea on exertion and has occasional chest pains that radiate through to her shoulder blade. She had lost a bunch of weight, but recently gained it back. She is active at work, but gets no regular exercise. She smoked for a long time, but quit about eight years ago. She has longstanding hypertension, which has recently been poorly controlled. She is said to have prediabetes and she has hyperlipidemia. Family history is negative for premature coronary disease. She recently saw Dr. Clay Waller who obtained an EKG, which showed normal sinus rhythm with nonspecific ST-T wave changes, slightly more pronounced than on her tracing from a few years ago. She sleeps poorly and has done so for many years. She suffers from anxiety and stress and recently she said this has been worse and she started a new anxiety medication. She has frequent heartburn and indigestion.   She complains of frequent urination and she has some mild generalized arthritis. CARDIAC ROS:   negative for syncope, orthopnea, paroxysmal nocturnal dyspnea, claudication, lower extremity edema    Family History   Problem Relation Age of Onset    Hypertension Mother    [employer] Elevated Lipids Mother     Breast Cancer Paternal Aunt     Prostate Cancer Paternal Uncle     Heart Disease Neg Hx        Past Medical History:   Diagnosis Date    Asthma     allergy induced    Crohn disease ([facility] [address] 04/2014    Notes treated, then stopped meds. Recurrent bleeding and re-treated by GI, but pt stopped on her own. She has not had GI follow-up since ~2014.  Diverticulitis     GERD (gastroesophageal reflux disease)     Herpes     History of colonoscopy     Colonoscopy x 3 in past.  Pt notes dx of Crohn's but she disputes dx.  Hypercholesterolemia     Hypertension     Hypomagnesemia        GENERAL ROS:  A comprehensive review of systems was negative except for that written in the HPI. Visit Vitals  BP (!) 130/100 (BP 1 Location: Left arm, BP Patient Position: Sitting)   Pulse 90   Resp 16   Ht 5' 8\" (1.727 m)   Wt 164 lb 3.2 oz (74.5 kg)   SpO2 98%   BMI 24.97 kg/m²       Wt Readings from Last 3 Encounters:   04/23/19 164 lb 3.2 oz (74.5 kg)   04/18/19 164 lb 6.4 oz (74.6 kg)   01/14/19 160 lb (72.6 kg)            BP Readings from Last 3 Encounters:   04/23/19 (!) 130/100   04/18/19 129/88   01/14/19 138/90       PHYSICAL EXAM  General appearance: alert, cooperative, no distress, appears stated age  Neurologic: Alert and oriented X 3  Neck: supple, symmetrical, trachea midline, no adenopathy, no carotid bruit and no JVD  Lungs: clear to auscultation bilaterally  Heart: regular rate and rhythm, S1, S2 normal, no murmur, click, rub or gallop  Abdomen: soft, non-tender.  Bowel sounds normal. No masses,  no organomegaly  Extremities: extremities normal, atraumatic, no cyanosis or edema  Pulses: 2+ and symmetric    Lab Results   Component Value Date/Time    Cholesterol, total 261 (H) 10/29/2018 10:25 AM    Cholesterol, total 193 09/01/2017 11:15 AM    Cholesterol, total 216 (H) 05/24/2016 09:11 AM    Cholesterol, total 203 (H) 09/17/2014 09:25 AM    Cholesterol, total 173 04/03/2013 09:09 AM    HDL Cholesterol 66 10/29/2018 10:25 AM    HDL Cholesterol 71 09/01/2017 11:15 AM    HDL Cholesterol 73 05/24/2016 09:11 AM    HDL Cholesterol 71 09/17/2014 09:25 AM    HDL Cholesterol 58 04/03/2013 09:09 AM    LDL, calculated 165 (H) 10/29/2018 10:25 AM    LDL, calculated 91 09/01/2017 11:15 AM    LDL, calculated 121 (H) 05/24/2016 09:11 AM    LDL, calculated 113 (H) 09/17/2014 09:25 AM    LDL, calculated 89 04/03/2013 09:09 AM    Triglyceride 148 10/29/2018 10:25 AM    Triglyceride 154 (H) 09/01/2017 11:15 AM    Triglyceride 111 05/24/2016 09:11 AM    Triglyceride 94 09/17/2014 09:25 AM    Triglyceride 131 04/03/2013 09:09 AM     ASSESSMENT  Ms. Shea has ongoing cardiac risk factors and symptoms with both typical and atypical features. It may turn out all to be stress related and in part due to her sleep disturbance. We did talk about possibility of sleep apnea and how that might contribute to her health problems. She has an abnormal EKG and with poorly controlled blood pressure, I think we are going to need to do a Lexiscan Cardiolite and get an echocardiogram to further risk stratify her. In the meantime, I want her to double up on her Losartan and we sent in a new prescription for the 50 mg tablets, which she will take when she runs out of what she currently has. We also talked about symptoms that would prompt an urgent return visit here or a call to 911. we will provide her with an event monitor to further characterize her palpitations     current treatment plan is effective, no change in therapy  lab results and schedule of future lab studies reviewed with patient  reviewed diet, exercise and weight control    Encounter Diagnoses   Name Primary?     Abnormal EKG Yes    Mixed hyperlipidemia     Essential hypertension, benign     Palpitations     Other chest pain     SOB (shortness of breath)      Orders Placed This Encounter    losartan (COZAAR) 50 mg tablet       Follow-up and Dispositions    · Return in about 1 month (around 5/21/2019).          Yolanda Taylor MD  4/23/2019

## 2019-04-23 NOTE — PROGRESS NOTES
Dr. Vanessa Kaplan would like to have an event monitor mailed to patient.  Dx abnormal ekg, sob, cp, uncontrolled BP, hyperlipidemia

## 2019-05-09 ENCOUNTER — TELEPHONE (OUTPATIENT)
Dept: CARDIOLOGY CLINIC | Age: 49
End: 2019-05-09

## 2019-05-09 NOTE — TELEPHONE ENCOUNTER
Called patient. Notified her echo and stress test were both normal. She will keep f/u as scheduled. Patient verbalized understanding and denied further questions or concerns.

## 2019-05-09 NOTE — TELEPHONE ENCOUNTER
----- Message from Ny Poretr MD sent at 5/9/2019 10:39 AM EDT -----  Heart muscle is strong and heart valves all ok. Stress test shows no evidence of any blockages as cause for recent symptoms.  Everything is normal.

## 2019-05-09 NOTE — TELEPHONE ENCOUNTER
Per Dr. Yara Spencer, stress test was normal. Patient also had an echo. Once echo has been read and reviewed by Dr. Rachel Goetz, will call patient with both results.

## 2019-05-09 NOTE — TELEPHONE ENCOUNTER
----- Message from [address] [facility] [address], [place] sent at 5/9/2019 10:40 AM EDT -----  Normal, no evidence for any blockages as cause for recent symptoms.  normal

## 2019-05-14 ENCOUNTER — DOCUMENTATION ONLY (OUTPATIENT)
Dept: CARDIOLOGY CLINIC | Age: 49
End: 2019-05-14

## 2019-08-17 DIAGNOSIS — G47.00 INSOMNIA, UNSPECIFIED TYPE: ICD-10-CM

## 2019-08-17 DIAGNOSIS — I10 ESSENTIAL HYPERTENSION, BENIGN: ICD-10-CM

## 2019-08-17 DIAGNOSIS — E78.5 HYPERLIPIDEMIA, UNSPECIFIED HYPERLIPIDEMIA TYPE: ICD-10-CM

## 2019-08-19 RX ORDER — METOPROLOL SUCCINATE 100 MG/1
TABLET, EXTENDED RELEASE ORAL
Qty: 90 TAB | Refills: 3 | Status: SHIPPED | OUTPATIENT
Start: 2019-08-19 | End: 2020-06-26 | Stop reason: SDUPTHER

## 2019-08-19 RX ORDER — TRAZODONE HYDROCHLORIDE 50 MG/1
TABLET ORAL
Qty: 90 TAB | Refills: 3 | Status: SHIPPED | OUTPATIENT
Start: 2019-08-19 | End: 2020-06-26 | Stop reason: SDUPTHER

## 2019-08-19 RX ORDER — ATORVASTATIN CALCIUM 10 MG/1
TABLET, FILM COATED ORAL
Qty: 90 TAB | Refills: 3 | Status: SHIPPED | OUTPATIENT
Start: 2019-08-19 | End: 2020-06-26 | Stop reason: SDUPTHER

## 2019-09-15 DIAGNOSIS — A60.00 GENITAL HERPES SIMPLEX, UNSPECIFIED SITE: ICD-10-CM

## 2019-09-15 NOTE — LETTER
9/18/2019 Amanda Rendon 
[address] [facility] [id number] Dear Ms. Aubrie Sun, 
 
Med Refill Appointment Needed: We have received a medication renewal request for you but have been unable to reach you by phone to discuss this further. Without office visits and appropriate monitoring, your healthcare provider cannot be sure that the medication they are prescribing is treating your conditions effectively. Please contact the office at the number above to schedule a follow up visit. Sincerely, 
 
 
 
Guillermo Samayoa MD  
 
Be aware that although these visits are important to keeping you well, your visit may be subject to fees such as co-pays, deductibles, etc.  Please contact your insurance carrier for your specific plan details if you are unsure.

## 2019-09-17 RX ORDER — VALACYCLOVIR HYDROCHLORIDE 500 MG/1
TABLET, FILM COATED ORAL
Qty: 90 TAB | Refills: 0 | Status: SHIPPED | OUTPATIENT
Start: 2019-09-17 | End: 2021-04-09 | Stop reason: ALTCHOICE

## 2019-09-17 NOTE — TELEPHONE ENCOUNTER
Cortestesyanira refill completed. Patient overdue for monitoring and follow-up. Please call and advise no further refills unless follow-up in office completed. If not able to reach, please mail letter. Medication:   Orders Placed This Encounter    valACYclovir (VALTREX) 500 mg tablet     Sig: TAKE 1 TABLET BY MOUTH ONCE DAILY     Dispense:  90 Tab     Refill:  0     Reason for follow-up:   Well woman due in October. Follow-up liver inflammation, htn.      Thanks  Cristela Wang MD

## 2019-09-18 NOTE — TELEPHONE ENCOUNTER
LVM on secure voice mail instructing pt to contact office to schedule f/u appt (Well woman, f/u htn, liver inflammation). Please schedule appt when pt calls. Letter mailed to pt's address to contact office to schedule appt.

## 2019-11-05 DIAGNOSIS — J45.20 ALLERGIC ASTHMA, MILD INTERMITTENT, UNCOMPLICATED: ICD-10-CM

## 2019-11-05 NOTE — TELEPHONE ENCOUNTER
Medication refill request:    Last Office Visit:  4/18/19  Next Office Visit:  No future appointments. Pharmacy verified.   Yes        lvm for pt to return call to office pt is also overdue for f/u appt

## 2019-11-06 RX ORDER — ALBUTEROL SULFATE 90 UG/1
2 AEROSOL, METERED RESPIRATORY (INHALATION)
Qty: 1 INHALER | Refills: 0 | Status: SHIPPED | OUTPATIENT
Start: 2019-11-06 | End: 2019-11-24 | Stop reason: SDUPTHER

## 2019-11-24 DIAGNOSIS — J45.20 ALLERGIC ASTHMA, MILD INTERMITTENT, UNCOMPLICATED: ICD-10-CM

## 2019-11-25 RX ORDER — ALBUTEROL SULFATE 90 UG/1
AEROSOL, METERED RESPIRATORY (INHALATION)
Qty: 8.5 G | Refills: 0 | Status: SHIPPED | OUTPATIENT
Start: 2019-11-25 | End: 2020-03-23

## 2019-12-12 DIAGNOSIS — A60.00 GENITAL HERPES SIMPLEX, UNSPECIFIED SITE: ICD-10-CM

## 2019-12-13 RX ORDER — VALACYCLOVIR HYDROCHLORIDE 500 MG/1
TABLET, FILM COATED ORAL
Qty: 90 TAB | Refills: 0 | OUTPATIENT
Start: 2019-12-13

## 2019-12-31 DIAGNOSIS — J45.20 ALLERGIC ASTHMA, MILD INTERMITTENT, UNCOMPLICATED: ICD-10-CM

## 2020-01-01 RX ORDER — ALBUTEROL SULFATE 90 UG/1
AEROSOL, METERED RESPIRATORY (INHALATION)
Qty: 8.5 G | Refills: 0 | OUTPATIENT
Start: 2020-01-01

## 2020-01-01 NOTE — TELEPHONE ENCOUNTER
Declined alabuterol refill. Patient has declined to come in or schedule follow-up x4 months and 5 prior refill encounters. Advised pharmacy to have patient call. Please confirm follow-up appointment when patient calls office.      Rimma Rashid MD

## 2020-01-03 NOTE — TELEPHONE ENCOUNTER
Left detailed message that  refused her medication and to callback the office to make a follow-up appt.

## 2020-03-14 DIAGNOSIS — R94.31 ABNORMAL EKG: ICD-10-CM

## 2020-03-14 DIAGNOSIS — I10 ESSENTIAL HYPERTENSION, BENIGN: ICD-10-CM

## 2020-03-14 DIAGNOSIS — E78.2 MIXED HYPERLIPIDEMIA: ICD-10-CM

## 2020-03-16 RX ORDER — LOSARTAN POTASSIUM 50 MG/1
TABLET ORAL
Qty: 90 TAB | Refills: 1 | OUTPATIENT
Start: 2020-03-16

## 2020-03-16 RX ORDER — LOSARTAN POTASSIUM 50 MG/1
TABLET ORAL
Qty: 90 TAB | Refills: 0 | Status: SHIPPED | OUTPATIENT
Start: 2020-03-16 | End: 2020-06-12

## 2020-03-16 NOTE — TELEPHONE ENCOUNTER
Requested Prescriptions     Signed Prescriptions Disp Refills    losartan (COZAAR) 50 mg tablet 90 Tab 0     Sig: take 1 tablet by mouth once daily     Authorizing Provider: Kami Lee     Ordering User: Ivy Burton     Refused Prescriptions Disp Refills    losartan (COZAAR) 50 mg tablet [Pharmacy Med Name: LOSARTAN 50MG TABLETS] 90 Tab 1     Sig: TAKE 1 TABLET BY MOUTH ONCE DAILY     Refused By: Chelo Jones     Reason for Refusal: Request already responded to by other means (e.g. phone or fax)      Per Dr. Church Post verbal orders    Will eventually need to f/u in office with Dr. Rehana Delarosa. Due to current issues regarding virus, will not require f/u for refill.

## 2020-03-16 NOTE — TELEPHONE ENCOUNTER
Patients pharmacy is calling to check on refill for Losartan 50 mg. They stated that they have not gotten a refill and that it was denied because it was refilled by other means. Please advice.      Phone: [phone]

## 2020-03-20 DIAGNOSIS — J45.20 ALLERGIC ASTHMA, MILD INTERMITTENT, UNCOMPLICATED: ICD-10-CM

## 2020-03-23 RX ORDER — ALBUTEROL SULFATE 90 UG/1
AEROSOL, METERED RESPIRATORY (INHALATION)
Qty: 8.5 G | Refills: 0 | Status: SHIPPED | OUTPATIENT
Start: 2020-03-23

## 2020-03-26 DIAGNOSIS — J45.30 ALLERGIC ASTHMA, MILD PERSISTENT, UNCOMPLICATED: ICD-10-CM

## 2020-03-26 NOTE — TELEPHONE ENCOUNTER
Last visit 04/18/2019 MD Elena Meadows   Next appointment 05/28/2020 MD Elena Meadows   Previous refill encounter(s) 08/17/2018 Singulair #90 with 5 refills     Requested Prescriptions     Pending Prescriptions Disp Refills    montelukast (SINGULAIR) 10 mg tablet 90 Tab 3     Sig: Take 1 Tab by mouth daily.

## 2020-03-27 RX ORDER — MONTELUKAST SODIUM 10 MG/1
10 TABLET ORAL DAILY
Qty: 90 TAB | Refills: 0 | Status: SHIPPED | OUTPATIENT
Start: 2020-03-27

## 2020-05-13 DIAGNOSIS — F41.1 GAD (GENERALIZED ANXIETY DISORDER): ICD-10-CM

## 2020-05-13 RX ORDER — SERTRALINE HYDROCHLORIDE 50 MG/1
TABLET, FILM COATED ORAL
Qty: 90 TAB | Refills: 0 | Status: SHIPPED | OUTPATIENT
Start: 2020-05-13 | End: 2020-06-26 | Stop reason: SDUPTHER

## 2020-05-13 NOTE — TELEPHONE ENCOUNTER
Please call and confirm follow-up virtual in next week. I will send a curtesy refill to bridge to this visit. (scheduled on 5/28, but should change to virtual).      Thanks  Alessandra Young MD

## 2020-05-14 NOTE — TELEPHONE ENCOUNTER
Patient advised needs virtual appointment in the next week with dr. Suri Black and advised appointment scheduled 5/28 would need to be rescheduled.

## 2020-06-12 DIAGNOSIS — E78.2 MIXED HYPERLIPIDEMIA: ICD-10-CM

## 2020-06-12 DIAGNOSIS — K21.9 GASTROESOPHAGEAL REFLUX DISEASE WITHOUT ESOPHAGITIS: ICD-10-CM

## 2020-06-12 DIAGNOSIS — I10 ESSENTIAL HYPERTENSION, BENIGN: ICD-10-CM

## 2020-06-12 DIAGNOSIS — R94.31 ABNORMAL EKG: ICD-10-CM

## 2020-06-12 RX ORDER — PANTOPRAZOLE SODIUM 40 MG/1
TABLET, DELAYED RELEASE ORAL
Qty: 90 TAB | Refills: 0 | OUTPATIENT
Start: 2020-06-12

## 2020-06-12 RX ORDER — LOSARTAN POTASSIUM 50 MG/1
TABLET ORAL
Qty: 30 TAB | Refills: 1 | Status: SHIPPED | OUTPATIENT
Start: 2020-06-12 | End: 2020-06-26 | Stop reason: SDUPTHER

## 2020-06-12 NOTE — TELEPHONE ENCOUNTER
Requested Prescriptions     Signed Prescriptions Disp Refills    losartan (COZAAR) 50 mg tablet 30 Tab 1     Sig: TAKE 1 TABLET BY MOUTH EVERY DAY. NEED TO SCHEDULE VIRTUAL VISIT, PHONE CALL VISIT, OR OFFICE VISIT PRIOR TO FURTHER REFILLS.      Authorizing Provider: Anai Bermudez     Ordering User: Unruly Campo    Per [facility] verbal orders

## 2020-06-25 DIAGNOSIS — J45.30 ALLERGIC ASTHMA, MILD PERSISTENT, UNCOMPLICATED: ICD-10-CM

## 2020-06-26 ENCOUNTER — VIRTUAL VISIT (OUTPATIENT)
Dept: INTERNAL MEDICINE CLINIC | Age: 50
End: 2020-06-26

## 2020-06-26 DIAGNOSIS — I10 ESSENTIAL HYPERTENSION, BENIGN: ICD-10-CM

## 2020-06-26 DIAGNOSIS — R73.09 ELEVATED GLUCOSE: ICD-10-CM

## 2020-06-26 DIAGNOSIS — R79.89 ELEVATED LFTS: ICD-10-CM

## 2020-06-26 DIAGNOSIS — F41.1 GAD (GENERALIZED ANXIETY DISORDER): ICD-10-CM

## 2020-06-26 DIAGNOSIS — K21.9 GASTROESOPHAGEAL REFLUX DISEASE WITHOUT ESOPHAGITIS: ICD-10-CM

## 2020-06-26 DIAGNOSIS — E78.2 MIXED HYPERLIPIDEMIA: ICD-10-CM

## 2020-06-26 DIAGNOSIS — F10.20 UNCOMPLICATED ALCOHOL DEPENDENCE (HCC): Primary | ICD-10-CM

## 2020-06-26 DIAGNOSIS — E78.5 HYPERLIPIDEMIA, UNSPECIFIED HYPERLIPIDEMIA TYPE: ICD-10-CM

## 2020-06-26 DIAGNOSIS — R94.31 ABNORMAL EKG: ICD-10-CM

## 2020-06-26 DIAGNOSIS — G47.00 INSOMNIA, UNSPECIFIED TYPE: ICD-10-CM

## 2020-06-26 RX ORDER — PANTOPRAZOLE SODIUM 40 MG/1
TABLET, DELAYED RELEASE ORAL
Qty: 90 TAB | Refills: 1 | Status: SHIPPED | OUTPATIENT
Start: 2020-06-26 | End: 2020-12-29 | Stop reason: SDUPTHER

## 2020-06-26 RX ORDER — LOSARTAN POTASSIUM 50 MG/1
TABLET ORAL
Qty: 90 TAB | Refills: 1 | Status: SHIPPED | OUTPATIENT
Start: 2020-06-26 | End: 2020-12-29 | Stop reason: SDUPTHER

## 2020-06-26 RX ORDER — MONTELUKAST SODIUM 10 MG/1
TABLET ORAL
Qty: 90 TAB | Refills: 0 | OUTPATIENT
Start: 2020-06-26

## 2020-06-26 RX ORDER — ATORVASTATIN CALCIUM 10 MG/1
TABLET, FILM COATED ORAL
Qty: 90 TAB | Refills: 1 | Status: SHIPPED | OUTPATIENT
Start: 2020-06-26 | End: 2020-12-29 | Stop reason: SDUPTHER

## 2020-06-26 RX ORDER — METOPROLOL SUCCINATE 100 MG/1
TABLET, EXTENDED RELEASE ORAL
Qty: 90 TAB | Refills: 1 | Status: SHIPPED | OUTPATIENT
Start: 2020-06-26 | End: 2020-12-29 | Stop reason: SDUPTHER

## 2020-06-26 RX ORDER — SERTRALINE HYDROCHLORIDE 100 MG/1
100 TABLET, FILM COATED ORAL DAILY
Qty: 90 TAB | Refills: 1 | Status: SHIPPED | OUTPATIENT
Start: 2020-06-26 | End: 2020-12-29 | Stop reason: SDUPTHER

## 2020-06-26 RX ORDER — TRAZODONE HYDROCHLORIDE 50 MG/1
TABLET ORAL
Qty: 90 TAB | Refills: 1 | Status: SHIPPED | OUTPATIENT
Start: 2020-06-26 | End: 2020-12-29 | Stop reason: SDUPTHER

## 2020-06-26 NOTE — PROGRESS NOTES
Virtual visit     Chief Complaint   Patient presents with    Medication Evaluation     reports needs refill for protonix     Insomnia     reports medication for sleep no longer helping, reports only sleeping a couple hours a night for most days of the week for a few months now     1. Have you been to the ER, urgent care clinic since your last visit? Hospitalized since your last visit? No    2. Have you seen or consulted any other health care providers outside of the [address] [place] [address] since your last visit? Include any pap smears or colon screening.  Saw Paulette 1265 pap and mammogram  - advised possibly in menopause already had IUD removed     Health Maintenance Due   Topic Date Due    PAP AKA CERVICAL CYTOLOGY  12/31/1991    Lipid Screen  10/29/2019       Learning Assessment 10/29/2018   PRIMARY LEARNER Patient   HIGHEST LEVEL OF EDUCATION - PRIMARY LEARNER  -   BARRIERS PRIMARY LEARNER NONE   CO-LEARNER CAREGIVER -   PRIMARY LANGUAGE ENGLISH   LEARNER PREFERENCE PRIMARY LISTENING   ANSWERED BY patient   RELATIONSHIP SELF

## 2020-06-26 NOTE — PROGRESS NOTES
Desire Krause is a 52 y.o. female evaluated via audio only technology on 6/26/2020. Consent: She and/or her health care decision maker is aware that she may receive a bill for this audio only encounter, depending on her insurance coverage, and has provided verbal consent to proceed: Yes    I communicated with the patient and/or health care decision maker about the nature and details of the following:  Assessment & Plan:   Diagnoses and all orders for this visit:    1. Alcohol dependence (HCC)  -     METABOLIC PANEL, COMPREHENSIVE  -     CBC WITH AUTOMATED DIFF    2. DANNY (generalized anxiety disorder)  -     sertraline (ZOLOFT) 100 mg tablet; Take 1 Tab by mouth daily.  -     CBC WITH AUTOMATED DIFF    3. Essential hypertension, benign  -     losartan (COZAAR) 50 mg tablet; TAKE 1 TABLET BY MOUTH EVERY DAY. -     metoprolol succinate (TOPROL-XL) 100 mg tablet; TAKE 1 TABLET BY MOUTH ONCE DAILY    4. Elevated LFTs  -     METABOLIC PANEL, COMPREHENSIVE    5. Mixed hyperlipidemia  -     losartan (COZAAR) 50 mg tablet; TAKE 1 TABLET BY MOUTH EVERY DAY. 6. Gastroesophageal reflux disease without esophagitis  -     pantoprazole (PROTONIX) 40 mg tablet; TAKE 1 TABLET BY MOUTH ONCE DAILY    7. Hyperlipidemia, unspecified hyperlipidemia type  -     LIPID PANEL  -     atorvastatin (LIPITOR) 10 mg tablet; TAKE 1 TABLET BY MOUTH ONCE DAILY  -     METABOLIC PANEL, COMPREHENSIVE    8. Abnormal EKG  -     losartan (COZAAR) 50 mg tablet; TAKE 1 TABLET BY MOUTH EVERY DAY. 9. Insomnia, unspecified type  -     traZODone (DESYREL) 50 mg tablet; TAKE 1 TABLET BY MOUTH NIGHTLY    10. Elevated glucose  -     HEMOGLOBIN A1C WITH EAG      DANNY: continues to take sertraline, with increased symptoms, will incrase from 50 to 100mg. Continue to discuss stopping drinking. Labs requested to monitor.      HTN: refilled losrtan  Follow-up in office needed to verify BP    GERd: uncontrolled at this time, continue pantoprazole, discussed stopping alcohol to help. \" - increase sertraline from 50 to 100.    - call and schedule with therapist.    - follow-up in office in ~ 2 months. - complete fasting labs at [address]  \"  Follow-up and Dispositions    · Return in about 2 months (around 8/26/2020) for well visit and follow-up mood. 12  Subjective:   Robyn Laws is a 52 y.o. female who was seen for Medication Evaluation (reports needs refill for protonix ) and Insomnia (reports medication for sleep no longer helping, reports only sleeping a couple hours a night for most days of the week for a few months now)    52year old woman. History signficiant for anxiety, alcohol use, elevated liver enzymes. Last seen in office 1/2019. Presents today to follow-up and discuss worsening anxiety/insomnia. Sochx: has jordan working from home for 2 months. Works in sales. Is     Reports that she has cut back on drinking, less frequently from before. Does notice some increase in anxiety over the past 2-3 months. Notices that she has some claustrophobia wearing a mask. States that she is taking her zoloft every day at 50mg. Feels that this helps her anxiety be less. Agrees that she has a lot on her mind and wonders if that is part of what is not   Lives with boyfriend. Notes some increase in anxiety and panic attacks. Monitoring blood pressure at home. Prior to Admission medications    Medication Sig Start Date End Date Taking?  Authorizing Provider   pantoprazole (PROTONIX) 40 mg tablet TAKE 1 TABLET BY MOUTH ONCE DAILY 6/26/20  Yes Thee Hunt MD   atorvastatin (LIPITOR) 10 mg tablet TAKE 1 TABLET BY MOUTH ONCE DAILY 6/26/20  Yes Thee Hunt MD   losartan (COZAAR) 50 mg tablet TAKE 1 TABLET BY MOUTH EVERY DAY. 6/26/20  Yes Thee Hunt MD   metoprolol succinate (TOPROL-XL) 100 mg tablet TAKE 1 TABLET BY MOUTH ONCE DAILY 6/26/20  Yes Thee Hunt MD   sertraline (ZOLOFT) 100 mg tablet Take 1 Tab by mouth daily. 6/26/20  Yes Dede Cano MD   traZODone (DESYREL) 50 mg tablet TAKE 1 TABLET BY MOUTH NIGHTLY 6/26/20  Yes Dede Cano MD   montelukast (SINGULAIR) 10 mg tablet Take 1 Tab by mouth daily. 3/27/20  Yes Dede Cano MD   albuterol (PROVENTIL HFA, VENTOLIN HFA, PROAIR HFA) 90 mcg/actuation inhaler INHALE 2 PUFFS BY MOUTH EVERY 4 HOURS AS NEEDED FOR WHEEZING; SCHEDULE APPOINTMENT WITH PROVIDER 3/23/20  Yes Dede Cano MD   nitroglycerin (NITROSTAT) 0.3 mg SL tablet 1 Tab by SubLINGual route every five (5) minutes as needed for Chest Pain. Call 911 if pain not resolved in 10 mintues 4/18/19  Yes Dede Cano MD   losartan (COZAAR) 50 mg tablet TAKE 1 TABLET BY MOUTH EVERY DAY. NEED TO SCHEDULE VIRTUAL VISIT, PHONE CALL VISIT, OR OFFICE VISIT PRIOR TO FURTHER REFILLS. 6/12/20 6/26/20  Carisa Brokos III, MD   sertraline (ZOLOFT) 50 mg tablet TAKE 1 TABLET BY MOUTH ONCE DAILY 5/13/20 6/26/20  Dede Cano MD   pantoprazole (PROTONIX) 40 mg tablet TAKE 1 TABLET BY MOUTH ONCE DAILY 3/14/20 6/26/20  Dede Cano MD   valACYclovir (VALTREX) 500 mg tablet TAKE 1 TABLET BY MOUTH ONCE DAILY 9/17/19   Dede Cano MD   atorvastatin (LIPITOR) 10 mg tablet TAKE 1 TABLET BY MOUTH ONCE DAILY 8/19/19 6/26/20  Dede Cano MD   traZODone (DESYREL) 50 mg tablet TAKE 1 TABLET BY MOUTH NIGHTLY 8/19/19 6/26/20  Dede Cano MD   metoprolol succinate (TOPROL-XL) 100 mg tablet TAKE 1 TABLET BY MOUTH ONCE DAILY 8/19/19 6/26/20  Dede Cano MD   aspirin (ASPIRIN) 325 mg tablet Take 1 Tab by mouth daily. 4/18/19   Dede Cano MD   coenzyme q10 (CO Q-10) 10 mg cap Take  by mouth. Provider, Historical   thiamine (B-1) 100 mg tablet Take 1 Tab by mouth daily. 9/6/17   eDde Cano MD   folic acid 164 mcg tablet Take 1 Tab by mouth daily.  9/6/17   Dede Cano MD   levonorgestrel (MIRENA) 20 mcg/24 hr (5 years) IUD 1 Each by IntraUTERine route once. Indications: removed 10/2019  6/26/20  Provider, Historical   Lactobacillus acidophilus (ACIDOPHILUS) cap Take 2 Caps by mouth two (2) times a day. Provider, Historical     No Known Allergies    Social History     Tobacco Use    Smoking status: Former Smoker     Packs/day: 0.25     Last attempt to quit: 10/3/2011     Years since quittin.7    Smokeless tobacco: Never Used   Substance Use Topics    Alcohol use: Yes     Alcohol/week: 12.0 standard drinks     Types: 12 Standard drinks or equivalent per week     Frequency: 4 or more times a week     Drinks per session: 1 or 2     Binge frequency: Weekly     Comment: occasionally      No flowsheet data found. Review of Systems   Constitutional: Negative for chills, fever and malaise/fatigue. HENT: Negative for congestion. Respiratory: Negative for cough and shortness of breath. Cardiovascular: Negative for chest pain and leg swelling. Skin: Negative for rash. Psychiatric/Behavioral: Negative for depression and suicidal ideas. The patient is nervous/anxious and has insomnia. I affirm this is a Patient-Initiated Episode with a Patient who has not had a related appointment within my department in the past 7 days or scheduled within the next 24 hours.     Total Time: minutes: 21-30 minutes 26 mintues and 50 seconds    Note: not billable if this call serves to triage the patient into an appointment for the relevant concern      Rj Howe MD

## 2020-06-26 NOTE — PATIENT INSTRUCTIONS
I am glad we were able to meet earlier today. Please call and schedule your follow-up appointment soon so you are able to get the date and time you need. We discussed:  
 - increase sertraline from 50 to 100.  
 - call and schedule with therapist.  
 - follow-up in office in ~ 2 months. - complete fasting labs at [address] [facility] [address] 
 
 - consider stopping all alcohol - this would benefit your health in many ways. Please do not hesitate to contact the office if any new questions or concerns. All the best,  
  
   Dr. Sylvester Pitts Psychologists/therapist/counsellor A good therapist is: 1) affordable and available. 2) certified and using a standardized practice. 3) someone you can develop trust and rapport with (this can take several visits. Resources to consider:  
 
Clinical Counseling and consulting [facility]:  
 [facility] [place] , [address] Phone: [phone] [facility] [phone] [facility], [address] [place] [address]  
[phone] [facility]. Phone [phone] [address] [place] [address] [phone] [address] 
[phone] Consider: 
-  reviewing listings at psychology today 
- asking friend for recommendation 
- calling insurance to ask who is in [place].

## 2020-10-26 ENCOUNTER — PATIENT MESSAGE (OUTPATIENT)
Dept: INTERNAL MEDICINE CLINIC | Age: 50
End: 2020-10-26

## 2020-10-27 ENCOUNTER — APPOINTMENT (OUTPATIENT)
Dept: INTERNAL MEDICINE CLINIC | Age: 50
End: 2020-10-27

## 2020-10-27 NOTE — TELEPHONE ENCOUNTER
Patient dropped off form. It indicates \"I hereby acknowledge that the undersigned patient has completed an annual physical and we have discussed recommended preventative care for his/her age, gender and health risk status. Completion range January 1-October 31, 2020. \"    Advised patient that we did not do this. And I would not be able to sign form, as doing so would be fraud. Encouraged her to reach out to insurance and explain she is doing this in November. Perhaps they would extend deadline with knowledge of barriers related to covid. Patient was upset. But stated she understood.      Tiffany Baker MD

## 2020-10-28 LAB
ALBUMIN SERPL-MCNC: 5.2 G/DL (ref 3.8–4.8)
ALBUMIN/GLOB SERPL: 2.5 {RATIO} (ref 1.2–2.2)
ALP SERPL-CCNC: 82 IU/L (ref 39–117)
ALT SERPL-CCNC: 55 IU/L (ref 0–32)
AST SERPL-CCNC: 59 IU/L (ref 0–40)
BASOPHILS # BLD AUTO: 0.1 X10E3/UL (ref 0–0.2)
BASOPHILS NFR BLD AUTO: 1 %
BILIRUB SERPL-MCNC: 0.7 MG/DL (ref 0–1.2)
BUN SERPL-MCNC: 9 MG/DL (ref 6–24)
BUN/CREAT SERPL: 11 (ref 9–23)
CALCIUM SERPL-MCNC: 9.2 MG/DL (ref 8.7–10.2)
CHLORIDE SERPL-SCNC: 97 MMOL/L (ref 96–106)
CHOLEST SERPL-MCNC: 230 MG/DL (ref 100–199)
CO2 SERPL-SCNC: 27 MMOL/L (ref 20–29)
CREAT SERPL-MCNC: 0.81 MG/DL (ref 0.57–1)
EOSINOPHIL # BLD AUTO: 0.2 X10E3/UL (ref 0–0.4)
EOSINOPHIL NFR BLD AUTO: 3 %
ERYTHROCYTE [DISTWIDTH] IN BLOOD BY AUTOMATED COUNT: 12.9 % (ref 11.7–15.4)
EST. AVERAGE GLUCOSE BLD GHB EST-MCNC: 111 MG/DL
GLOBULIN SER CALC-MCNC: 2.1 G/DL (ref 1.5–4.5)
GLUCOSE SERPL-MCNC: 96 MG/DL (ref 65–99)
HBA1C MFR BLD: 5.5 % (ref 4.8–5.6)
HCT VFR BLD AUTO: 41.8 % (ref 34–46.6)
HDLC SERPL-MCNC: 80 MG/DL
HGB BLD-MCNC: 14.3 G/DL (ref 11.1–15.9)
IMM GRANULOCYTES # BLD AUTO: 0 X10E3/UL (ref 0–0.1)
IMM GRANULOCYTES NFR BLD AUTO: 1 %
LDLC SERPL CALC-MCNC: 117 MG/DL (ref 0–99)
LYMPHOCYTES # BLD AUTO: 1.6 X10E3/UL (ref 0.7–3.1)
LYMPHOCYTES NFR BLD AUTO: 24 %
MCH RBC QN AUTO: 34.1 PG (ref 26.6–33)
MCHC RBC AUTO-ENTMCNC: 34.2 G/DL (ref 31.5–35.7)
MCV RBC AUTO: 100 FL (ref 79–97)
MONOCYTES # BLD AUTO: 0.6 X10E3/UL (ref 0.1–0.9)
MONOCYTES NFR BLD AUTO: 9 %
NEUTROPHILS # BLD AUTO: 4.1 X10E3/UL (ref 1.4–7)
NEUTROPHILS NFR BLD AUTO: 62 %
PLATELET # BLD AUTO: 197 X10E3/UL (ref 150–450)
POTASSIUM SERPL-SCNC: 3.8 MMOL/L (ref 3.5–5.2)
PROT SERPL-MCNC: 7.3 G/DL (ref 6–8.5)
RBC # BLD AUTO: 4.19 X10E6/UL (ref 3.77–5.28)
SODIUM SERPL-SCNC: 138 MMOL/L (ref 134–144)
TRIGL SERPL-MCNC: 193 MG/DL (ref 0–149)
VLDLC SERPL CALC-MCNC: 33 MG/DL (ref 5–40)
WBC # BLD AUTO: 6.6 X10E3/UL (ref 3.4–10.8)

## 2020-12-29 DIAGNOSIS — K21.9 GASTROESOPHAGEAL REFLUX DISEASE WITHOUT ESOPHAGITIS: ICD-10-CM

## 2020-12-29 DIAGNOSIS — G47.00 INSOMNIA, UNSPECIFIED TYPE: ICD-10-CM

## 2020-12-29 DIAGNOSIS — F41.1 GAD (GENERALIZED ANXIETY DISORDER): ICD-10-CM

## 2020-12-29 DIAGNOSIS — E78.5 HYPERLIPIDEMIA, UNSPECIFIED HYPERLIPIDEMIA TYPE: ICD-10-CM

## 2020-12-29 DIAGNOSIS — R94.31 ABNORMAL EKG: ICD-10-CM

## 2020-12-29 DIAGNOSIS — E78.2 MIXED HYPERLIPIDEMIA: ICD-10-CM

## 2020-12-29 DIAGNOSIS — I10 ESSENTIAL HYPERTENSION, BENIGN: ICD-10-CM

## 2020-12-29 NOTE — TELEPHONE ENCOUNTER
Writer sent patient a message via [facility] regarding scheduling an office visit. Last visit 06/26/2020 MD Allison Ca   Next appointment 11/23/2020 Patient canceled - Nothing rescheduled   Previous refill encounter(s)   06/26/2020:  - Toprol XL #90 with 1 refill,  - Cozaar #90 with 1 refills,  - Protonix #90 with 1 refills. Requested Prescriptions     Pending Prescriptions Disp Refills    pantoprazole (PROTONIX) 40 mg tablet 90 Tab 0     Sig: Take 1 Tab by mouth daily.  metoprolol succinate (TOPROL-XL) 100 mg tablet 90 Tab 0     Sig: Take 1 Tab by mouth daily.  losartan (COZAAR) 50 mg tablet 90 Tab 0     Sig: Take 1 Tab by mouth daily.

## 2020-12-30 RX ORDER — PANTOPRAZOLE SODIUM 40 MG/1
40 TABLET, DELAYED RELEASE ORAL DAILY
Qty: 90 TAB | Refills: 0 | Status: SHIPPED | OUTPATIENT
Start: 2020-12-30

## 2020-12-30 RX ORDER — SERTRALINE HYDROCHLORIDE 100 MG/1
100 TABLET, FILM COATED ORAL DAILY
Qty: 90 TAB | Refills: 0 | Status: SHIPPED | OUTPATIENT
Start: 2020-12-30

## 2020-12-30 RX ORDER — METOPROLOL SUCCINATE 100 MG/1
100 TABLET, EXTENDED RELEASE ORAL DAILY
Qty: 90 TAB | Refills: 0 | Status: SHIPPED | OUTPATIENT
Start: 2020-12-30

## 2020-12-30 RX ORDER — ATORVASTATIN CALCIUM 10 MG/1
10 TABLET, FILM COATED ORAL DAILY
Qty: 90 TAB | Refills: 0 | Status: SHIPPED | OUTPATIENT
Start: 2020-12-30

## 2020-12-30 RX ORDER — TRAZODONE HYDROCHLORIDE 50 MG/1
50 TABLET ORAL
Qty: 90 TAB | Refills: 0 | Status: SHIPPED | OUTPATIENT
Start: 2020-12-30

## 2020-12-30 RX ORDER — LOSARTAN POTASSIUM 50 MG/1
50 TABLET ORAL DAILY
Qty: 90 TAB | Refills: 0 | Status: SHIPPED | OUTPATIENT
Start: 2020-12-30

## 2021-04-09 ENCOUNTER — OFFICE VISIT (OUTPATIENT)
Dept: CARDIOLOGY CLINIC | Age: 51
End: 2021-04-09
Payer: COMMERCIAL

## 2021-04-09 VITALS
SYSTOLIC BLOOD PRESSURE: 102 MMHG | HEIGHT: 68 IN | WEIGHT: 151 LBS | DIASTOLIC BLOOD PRESSURE: 62 MMHG | BODY MASS INDEX: 22.88 KG/M2

## 2021-04-09 DIAGNOSIS — I10 ESSENTIAL HYPERTENSION, BENIGN: ICD-10-CM

## 2021-04-09 DIAGNOSIS — R94.31 ABNORMAL EKG: ICD-10-CM

## 2021-04-09 DIAGNOSIS — R00.2 PALPITATIONS: Primary | ICD-10-CM

## 2021-04-09 DIAGNOSIS — E78.2 MIXED HYPERLIPIDEMIA: ICD-10-CM

## 2021-04-09 PROCEDURE — 99214 OFFICE O/P EST MOD 30 MIN: CPT | Performed by: SPECIALIST

## 2021-04-09 PROCEDURE — 93000 ELECTROCARDIOGRAM COMPLETE: CPT | Performed by: SPECIALIST

## 2021-04-09 NOTE — PROGRESS NOTES
HISTORY OF PRESENT ILLNESS  Amanda Campbell Grandville is a 48 y.o. female     SUMMARY:   Problem List  Date Reviewed: 4/9/2021          Codes Class Noted    DANNY (generalized anxiety disorder) ICD-10-CM: F41.1  ICD-9-CM: 300.02  6/26/2020        Hyperlipidemia ICD-10-CM: E78.5  ICD-9-CM: 272.4  3/28/2018        Elevated LFTs ICD-10-CM: R79.89  ICD-9-CM: 790.6  11/6/2017        Herpes simplex vulvovaginitis ICD-10-CM: A60.04  ICD-9-CM: 054.11  8/23/2017        Alcohol dependence ([address] ICD-10-CM: F10.20  ICD-9-CM: 303.90  5/25/2016        Hypomagnesemia ICD-10-CM: E83.42  ICD-9-CM: 275.2  Unknown        Vitamin D deficiency ICD-10-CM: E55.9  ICD-9-CM: 268.9  3/18/2015        Crohn's disease - possible ICD-10-CM: K50.90  ICD-9-CM: 555.9  3/10/2015    Overview Signed 5/25/2016 11:16 AM by Melissa Bess MD     Patient reports GI did biopsy for ulcers in colon that was negative. Since changing diet in 2015 has not had recurrence of diarrhea nor abd pain. Allergic asthma ICD-10-CM: J45.909  ICD-9-CM: 493.00  9/28/2009        Essential hypertension, benign ICD-10-CM: I10  ICD-9-CM: 401.1  6/9/2009        Hemorrhoids ICD-10-CM: K64.9  ICD-9-CM: 455.6  6/9/2009              Current Outpatient Medications on File Prior to Visit   Medication Sig    pantoprazole (PROTONIX) 40 mg tablet Take 1 Tab by mouth daily.  metoprolol succinate (TOPROL-XL) 100 mg tablet Take 1 Tab by mouth daily.  losartan (COZAAR) 50 mg tablet Take 1 Tab by mouth daily.  traZODone (DESYREL) 50 mg tablet Take 1 Tab by mouth nightly. (Patient taking differently: Take 100 mg by mouth nightly.)    sertraline (ZOLOFT) 100 mg tablet Take 1 Tab by mouth daily.  atorvastatin (LIPITOR) 10 mg tablet Take 1 Tab by mouth daily.  montelukast (SINGULAIR) 10 mg tablet Take 1 Tab by mouth daily.     albuterol (PROVENTIL HFA, VENTOLIN HFA, PROAIR HFA) 90 mcg/actuation inhaler INHALE 2 PUFFS BY MOUTH EVERY 4 HOURS AS NEEDED FOR WHEEZING; SCHEDULE APPOINTMENT WITH PROVIDER    coenzyme q10 (CO Q-10) 10 mg cap Take  by mouth. No current facility-administered medications on file prior to visit. CARDIOLOGY STUDIES TO DATE:  5/19 negative lexiscan cardiolyte  05/08/19 normal echo    Chief Complaint   Patient presents with    Follow-up     HPI :  She returns for follow-up have not been seen for a couple years. About a month ago she was having a lot of palpitations which worried her and turned out she was not sleeping well at all that at that time and once that got straightened out things have all settled down. She also has some very localized pinprick-like discomfort in her left upper anterior chest and the root of her neck which occurs randomly it is not exertional and is not associated with any other symptoms. Recent LDL cholesterol was 117 with an HDL of 80. Blood pressure is well controlled and she is having no problems with her medications. CARDIAC ROS:   negative for dyspnea, syncope, orthopnea, paroxysmal nocturnal dyspnea, exertional chest pressure/discomfort, claudication, lower extremity edema    Family History   Problem Relation Age of Onset    Hypertension Mother    Erika Horn Elevated Lipids Mother     Breast Cancer Paternal Aunt     Prostate Cancer Paternal Uncle     Heart Disease Neg Hx        Past Medical History:   Diagnosis Date    Asthma     allergy induced    Crohn disease ([facility] [address] 04/2014    Notes treated, then stopped meds. Recurrent bleeding and re-treated by GI, but pt stopped on her own. She has not had GI follow-up since ~2014.  Diverticulitis     GERD (gastroesophageal reflux disease)     Herpes     History of colonoscopy     Colonoscopy x 3 in past.  Pt notes dx of Crohn's but she disputes dx.  Hypercholesterolemia     Hypertension     Hypomagnesemia        GENERAL ROS:  A comprehensive review of systems was negative except for that written in the HPI.     Visit Vitals  /62   Ht 5' 8\" (1.727 m) Wt 151 lb (68.5 kg)   BMI 22.96 kg/m²       Wt Readings from Last 3 Encounters:   04/09/21 151 lb (68.5 kg)   05/08/19 164 lb (74.4 kg)   05/08/19 164 lb (74.4 kg)            BP Readings from Last 3 Encounters:   04/09/21 102/62   05/08/19 (!) 130/100   04/23/19 (!) 130/100       PHYSICAL EXAM  General appearance: alert, cooperative, no distress, appears stated age  Neurologic: Alert and oriented X 3  Neck: supple, symmetrical, trachea midline, no adenopathy, no carotid bruit and no JVD  Lungs: clear to auscultation bilaterally  Heart: regular rate and rhythm, S1, S2 normal, no murmur, click, rub or gallop  Extremities: extremities normal, atraumatic, no cyanosis or edema    Lab Results   Component Value Date/Time    Cholesterol, total 230 (H) 10/27/2020 12:00 AM    Cholesterol, total 261 (H) 10/29/2018 10:25 AM    Cholesterol, total 193 09/01/2017 11:15 AM    Cholesterol, total 216 (H) 05/24/2016 09:11 AM    Cholesterol, total 203 (H) 09/17/2014 09:25 AM    HDL Cholesterol 80 10/27/2020 12:00 AM    HDL Cholesterol 66 10/29/2018 10:25 AM    HDL Cholesterol 71 09/01/2017 11:15 AM    HDL Cholesterol 73 05/24/2016 09:11 AM    HDL Cholesterol 71 09/17/2014 09:25 AM    LDL, calculated 117 (H) 10/27/2020 12:00 AM    LDL, calculated 165 (H) 10/29/2018 10:25 AM    LDL, calculated 91 09/01/2017 11:15 AM    LDL, calculated 121 (H) 05/24/2016 09:11 AM    LDL, calculated 113 (H) 09/17/2014 09:25 AM    LDL, calculated 89 04/03/2013 09:09 AM    Triglyceride 193 (H) 10/27/2020 12:00 AM    Triglyceride 148 10/29/2018 10:25 AM    Triglyceride 154 (H) 09/01/2017 11:15 AM    Triglyceride 111 05/24/2016 09:11 AM    Triglyceride 94 09/17/2014 09:25 AM     ASSESSMENT :      She is stable and essentially asymptomatic at this point on a reasonable medical regimen.   We talked a lot about palpitations and what might cause him and potential remedies, however since that is not really bothering her right now I do not think we need to pursue it any further. Going forward if she needs an event monitor she will let us know so we can further quantify things. Apparently her primary care wants to start her on some ADD medicine from a cardiac perspective that is perfectly fine. I recommended she get a calcium score to further define her risk and tell us how aggressive we need to be with her lipid management. At this time she needs no further cardiac testing. current treatment plan is effective, no change in therapy  lab results and schedule of future lab studies reviewed with patient  reviewed diet, exercise and weight control    Encounter Diagnoses   Name Primary?  Palpitations Yes    Essential hypertension, benign     Mixed hyperlipidemia     Abnormal EKG      Orders Placed This Encounter    AMB POC EKG ROUTINE W/ 12 LEADS, INTER & REP       Follow-up and Dispositions    · Return in about 1 year (around 4/9/2022). Mars Sweeney MD  4/9/2021  Please note that this dictation was completed with Declara, the computer voice recognition software. Quite often unanticipated grammatical, syntax, homophones, and other interpretive errors are inadvertently transcribed by the computer software. Please disregard these errors. Please excuse any errors that have escaped final proofreading. Thank you.

## 2021-05-06 ENCOUNTER — TRANSCRIBE ORDER (OUTPATIENT)
Dept: GENERAL RADIOLOGY | Age: 51
End: 2021-05-06

## 2021-05-06 ENCOUNTER — HOSPITAL ENCOUNTER (OUTPATIENT)
Dept: CT IMAGING | Age: 51
Discharge: HOME | End: 2021-05-06
Payer: SELF-PAY

## 2021-05-06 DIAGNOSIS — Z13.6 SCREENING FOR HYPERTENSION: Primary | ICD-10-CM

## 2021-05-06 DIAGNOSIS — Z13.6 SCREENING FOR HYPERTENSION: ICD-10-CM

## 2021-05-06 PROCEDURE — 75571 CT HRT W/O DYE W/CA TEST: CPT

## 2021-05-10 NOTE — CARDIO/PULMONARY
Reached patient at her given mobile number. Patient states she saw her coronary artery CT score of zero on [employer]. Discussed the meaning of this score. Patient has no further questions at this time.

## 2022-03-18 PROBLEM — E78.5 HYPERLIPIDEMIA: Status: ACTIVE | Noted: 2018-03-28

## 2022-03-19 PROBLEM — A60.04 HERPES SIMPLEX VULVOVAGINITIS: Status: ACTIVE | Noted: 2017-08-23

## 2022-03-19 PROBLEM — F41.1 GAD (GENERALIZED ANXIETY DISORDER): Status: ACTIVE | Noted: 2020-06-26

## 2022-03-19 PROBLEM — R79.89 ELEVATED LFTS: Status: ACTIVE | Noted: 2017-11-06

## 2022-10-20 ENCOUNTER — TRANSCRIBE ORDER (OUTPATIENT)
Dept: SCHEDULING | Age: 52
End: 2022-10-20

## 2022-10-20 DIAGNOSIS — Z91.89 OTHER SPECIFIED PERSONAL RISK FACTORS, NOT ELSEWHERE CLASSIFIED: Primary | ICD-10-CM

## 2024-06-13 ENCOUNTER — HOSPITAL ENCOUNTER (EMERGENCY)
Facility: HOSPITAL | Age: 54
Discharge: HOME | End: 2024-06-13
Payer: COMMERCIAL

## 2024-06-13 VITALS
HEIGHT: 68 IN | WEIGHT: 127 LBS | BODY MASS INDEX: 19.25 KG/M2 | OXYGEN SATURATION: 100 % | SYSTOLIC BLOOD PRESSURE: 153 MMHG | DIASTOLIC BLOOD PRESSURE: 102 MMHG | TEMPERATURE: 97.7 F | RESPIRATION RATE: 20 BRPM | HEART RATE: 74 BPM
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
## 2024-06-13 PROCEDURE — 99283 EMERGENCY DEPT VISIT LOW MDM: CPT

## 2024-06-13 PROCEDURE — 6370000000 HC RX 637 (ALT 250 FOR IP)

## 2024-06-13 RX ORDER — HYDROXYZINE HYDROCHLORIDE 25 MG/1
50 TABLET, FILM COATED ORAL ONCE
Status: COMPLETED | OUTPATIENT
Start: 2024-06-13 | End: 2024-06-13

## 2024-06-13 RX ORDER — BACITRACIN ZINC AND POLYMYXIN B SULFATE 500; 1000 [USP'U]/G; [USP'U]/G
OINTMENT TOPICAL
Qty: 15 G | Refills: 0 | Status: SHIPPED | OUTPATIENT
Start: 2024-06-13 | End: 2024-06-20

## 2024-06-13 RX ORDER — GINSENG 100 MG
CAPSULE ORAL ONCE
Status: COMPLETED | OUTPATIENT
Start: 2024-06-13 | End: 2024-06-13

## 2024-06-13 RX ORDER — HYDROXYZINE HYDROCHLORIDE 25 MG/1
25 TABLET, FILM COATED ORAL EVERY 8 HOURS PRN
Qty: 30 TABLET | Refills: 0 | Status: SHIPPED | OUTPATIENT
Start: 2024-06-13 | End: 2024-06-23

## 2024-06-13 RX ADMIN — BACITRACIN 1 EACH: 500 OINTMENT TOPICAL at 21:26

## 2024-06-13 RX ADMIN — HYDROXYZINE HYDROCHLORIDE 50 MG: 25 TABLET, FILM COATED ORAL at 21:26

## 2024-06-13 ASSESSMENT — LIFESTYLE VARIABLES
HOW OFTEN DO YOU HAVE A DRINK CONTAINING ALCOHOL: NEVER
HOW MANY STANDARD DRINKS CONTAINING ALCOHOL DO YOU HAVE ON A TYPICAL DAY: PATIENT DOES NOT DRINK

## 2024-06-13 ASSESSMENT — PAIN SCALES - GENERAL: PAINLEVEL_OUTOF10: 0

## 2024-06-14 NOTE — ED PROVIDER NOTES
[facility] EMERGENCY DEPT  EMERGENCY DEPARTMENT ENCOUNTER       Pt Name: Maria Eugenia Espinoza  MRN: [id number]  Birthdate 1970  Date of evaluation: 6/13/2024  Provider: Angela Cantu PA-C   PCP: Jalen Lawrence PA  Note Started: 8:33 PM EDT 6/13/24     CHIEF COMPLAINT       Chief Complaint   Patient presents with    Insect Bite     Pt was pulling weeds at home and looked down to see bite to right inner forearm w bruising and swelling. Pt also reports some itchiness. Pt did not see what bit her and did not feel the bite. Pt reports some lightheadedness.         HISTORY OF PRESENT ILLNESS: 1 or more elements      History From: Patient  HPI Limitations: None     Maria Eugenia Espinoza is a 53 y.o. female who presents to the emergency department for evaluation of bite to the right forearm.  Patient reports that she was pulling some weeds outside when she realized that she had some swelling and itchiness to the inner right forearm.  Patient states that she did not feel anything bite her or sting her, states that she did not see anything either.  Patient denies any falls or additional injury.  Patient does report that she has had some tequila today.  Denies any swelling of lips tongue or throat, shortness of breath, chest pains, or any additional symptoms at this time.     Nursing Notes were all reviewed and agreed with or any disagreements were addressed in the HPI.     REVIEW OF SYSTEMS      Review of Systems     Positives and Pertinent negatives as per HPI.    PAST HISTORY     Past Medical History:  Past Medical History:   Diagnosis Date    Asthma     allergy induced    Crohn disease (HCC) 04/2014    Notes treated, then stopped meds.  Recurrent bleeding and re-treated by GI, but pt stopped on her own.  She has not had GI follow-up since     Diverticulitis     GERD (gastroesophageal reflux disease)     Herpes     History of colonoscopy     Colonoscopy x 3 in past.  Pt notes dx of Crohn's but she disputes dx.       reviewed.   Constitutional:       General: She is not in acute distress.     Appearance: Normal appearance. She is not toxic-appearing.   HENT:      Head: Normocephalic and atraumatic.      Mouth/Throat:      Pharynx: Oropharynx is clear.   Eyes:      Conjunctiva/sclera: Conjunctivae normal.   Cardiovascular:      Rate and Rhythm: Normal rate and regular rhythm.   Pulmonary:      Effort: Pulmonary effort is normal.      Breath sounds: Normal breath sounds.   Skin:     General: Skin is warm and dry.      Findings: Lesion (See below) present.   Neurological:      General: No focal deficit present.      Mental Status: She is alert.   Psychiatric:         Mood and Affect: Mood normal.              DIAGNOSTIC RESULTS   LABS:     No results found for this or any previous visit (from the past 24 hour(s)).      EKG: When ordered, EKG's are interpreted by the Emergency Department Physician in the absence of a cardiologist.  Please see their note for interpretation of EKG.      RADIOLOGY:  Non-plain film images such as CT, Ultrasound and MRI are read by the radiologist. Plain radiographic images are visualized and preliminarily interpreted by the ED Provider with the below findings:   Interpretation per the Radiologist below, if available at the time of this note:     No results found.     PROCEDURES   Unless otherwise noted below, none  Procedures     CRITICAL CARE TIME   Patient does not meet Critical Care Time, 0 minutes     EMERGENCY DEPARTMENT COURSE and DIFFERENTIAL DIAGNOSIS/MDM   Vitals:    Vitals:    06/13/24 2000   BP: (!) 153/102   Pulse: 74   Resp: 20   Temp: 97.7 °F (36.5 °C)   SpO2: 100%   Weight: 57.6 kg (127 lb)   Height: 1.727 m (5' 8\")        Patient was given the following medications:  Medications - No data to display    CONSULTS: (Who and What was discussed)  None    Chronic Conditions:  has a past medical history of Asthma, Crohn disease (HCC), Diverticulitis, GERD (gastroesophageal reflux disease),